# Patient Record
Sex: MALE | Race: WHITE | NOT HISPANIC OR LATINO | Employment: PART TIME | URBAN - METROPOLITAN AREA
[De-identification: names, ages, dates, MRNs, and addresses within clinical notes are randomized per-mention and may not be internally consistent; named-entity substitution may affect disease eponyms.]

---

## 2018-01-24 ENCOUNTER — TELEPHONE (OUTPATIENT)
Dept: FAMILY MEDICINE | Facility: CLINIC | Age: 23
End: 2018-01-24

## 2018-01-24 ENCOUNTER — PATIENT MESSAGE (OUTPATIENT)
Dept: INTERNAL MEDICINE | Facility: CLINIC | Age: 23
End: 2018-01-24

## 2018-01-24 DIAGNOSIS — Z11.4 ENCOUNTER FOR SCREENING FOR HIV: Primary | ICD-10-CM

## 2018-01-24 DIAGNOSIS — Z11.3 SCREEN FOR STD (SEXUALLY TRANSMITTED DISEASE): ICD-10-CM

## 2018-01-25 ENCOUNTER — PATIENT MESSAGE (OUTPATIENT)
Dept: FAMILY MEDICINE | Facility: CLINIC | Age: 23
End: 2018-01-25

## 2018-01-25 ENCOUNTER — TELEPHONE (OUTPATIENT)
Dept: FAMILY MEDICINE | Facility: CLINIC | Age: 23
End: 2018-01-25

## 2018-01-26 NOTE — TELEPHONE ENCOUNTER
----- Message from Maisha Abreu sent at 1/25/2018  3:28 PM CST -----  Contact: Self/ [phone]  Patient called in returning your call. Please call and advise.

## 2018-01-30 ENCOUNTER — OFFICE VISIT (OUTPATIENT)
Dept: INTERNAL MEDICINE | Facility: CLINIC | Age: 23
End: 2018-01-30
Payer: COMMERCIAL

## 2018-01-30 ENCOUNTER — LAB VISIT (OUTPATIENT)
Dept: LAB | Facility: HOSPITAL | Age: 23
End: 2018-01-30
Attending: INTERNAL MEDICINE
Payer: COMMERCIAL

## 2018-01-30 ENCOUNTER — TELEPHONE (OUTPATIENT)
Dept: PHARMACY | Facility: CLINIC | Age: 23
End: 2018-01-30

## 2018-01-30 VITALS
HEART RATE: 102 BPM | WEIGHT: 147.94 LBS | HEIGHT: 71 IN | OXYGEN SATURATION: 97 % | DIASTOLIC BLOOD PRESSURE: 70 MMHG | SYSTOLIC BLOOD PRESSURE: 140 MMHG | BODY MASS INDEX: 20.71 KG/M2

## 2018-01-30 DIAGNOSIS — Z11.4 SCREENING FOR HIV (HUMAN IMMUNODEFICIENCY VIRUS): ICD-10-CM

## 2018-01-30 DIAGNOSIS — F90.9 ATTENTION DEFICIT HYPERACTIVITY DISORDER (ADHD), UNSPECIFIED ADHD TYPE: ICD-10-CM

## 2018-01-30 DIAGNOSIS — Z20.6 CONTACT WITH AND (SUSPECTED) EXPOSURE TO HUMAN IMMUNODEFICIENCY VIRUS (HIV): Primary | ICD-10-CM

## 2018-01-30 DIAGNOSIS — Z11.3 SCREEN FOR STD (SEXUALLY TRANSMITTED DISEASE): ICD-10-CM

## 2018-01-30 DIAGNOSIS — Z78.9 HEPATITIS B VIRUS SEROLOGIC STATUS UNKNOWN: ICD-10-CM

## 2018-01-30 DIAGNOSIS — Z11.3 ENCOUNTER FOR SCREENING EXAMINATION FOR SEXUALLY TRANSMITTED DISEASE: ICD-10-CM

## 2018-01-30 LAB
ALBUMIN SERPL BCP-MCNC: 4.5 G/DL
ANION GAP SERPL CALC-SCNC: 10 MMOL/L
BUN SERPL-MCNC: 10 MG/DL
CALCIUM SERPL-MCNC: 9.9 MG/DL
CHLORIDE SERPL-SCNC: 103 MMOL/L
CO2 SERPL-SCNC: 28 MMOL/L
CREAT SERPL-MCNC: 0.9 MG/DL
EST. GFR  (AFRICAN AMERICAN): >60 ML/MIN/1.73 M^2
EST. GFR  (NON AFRICAN AMERICAN): >60 ML/MIN/1.73 M^2
GLUCOSE SERPL-MCNC: 99 MG/DL
PHOSPHATE SERPL-MCNC: 2.6 MG/DL
POTASSIUM SERPL-SCNC: 3.9 MMOL/L
SODIUM SERPL-SCNC: 141 MMOL/L

## 2018-01-30 PROCEDURE — 86780 TREPONEMA PALLIDUM: CPT

## 2018-01-30 PROCEDURE — 86703 HIV-1/HIV-2 1 RESULT ANTBDY: CPT

## 2018-01-30 PROCEDURE — 3008F BODY MASS INDEX DOCD: CPT | Mod: S$GLB,,, | Performed by: INTERNAL MEDICINE

## 2018-01-30 PROCEDURE — 99999 PR PBB SHADOW E&M-EST. PATIENT-LVL IV: CPT | Mod: PBBFAC,,, | Performed by: INTERNAL MEDICINE

## 2018-01-30 PROCEDURE — 86706 HEP B SURFACE ANTIBODY: CPT

## 2018-01-30 PROCEDURE — 80069 RENAL FUNCTION PANEL: CPT

## 2018-01-30 PROCEDURE — 99204 OFFICE O/P NEW MOD 45 MIN: CPT | Mod: S$GLB,,, | Performed by: INTERNAL MEDICINE

## 2018-01-30 PROCEDURE — 87591 N.GONORRHOEAE DNA AMP PROB: CPT

## 2018-01-30 PROCEDURE — 36415 COLL VENOUS BLD VENIPUNCTURE: CPT | Mod: PO

## 2018-01-30 PROCEDURE — 86592 SYPHILIS TEST NON-TREP QUAL: CPT

## 2018-01-30 PROCEDURE — 87491 CHLMYD TRACH DNA AMP PROBE: CPT | Mod: 91

## 2018-01-30 RX ORDER — EMTRICITABINE AND TENOFOVIR DISOPROXIL FUMARATE 200; 300 MG/1; MG/1
1 TABLET, FILM COATED ORAL DAILY
Qty: 30 TABLET | Refills: 0 | Status: SHIPPED | OUTPATIENT
Start: 2018-01-30 | End: 2018-02-08 | Stop reason: SDUPTHER

## 2018-01-30 NOTE — PROGRESS NOTES
Portions of this note are generated with voice recognition software. Typographical errors may exist.       Patient Name:BHUPINDER TELLEZ JR.  Patient MRN:   [id number]    History of Present Illness   ================================================================  BHUPINDER TELLEZ JR. is a 22 y.o. male here for primary care visit for  Chief Complaint   Patient presents with    [facility]     new pt       Past Medical History:   Diagnosis Date    ADHD (attention deficit hyperactivity disorder)        No past surgical history on file.    Review of patient's allergies indicates:  No Known Allergies    No current outpatient prescriptions on file prior to visit.     No current facility-administered medications on file prior to visit.        Family History   Problem Relation Age of Onset    No Known Problems Mother     No Known Problems Father     Allergy (severe) Maternal Grandmother     No Known Problems Maternal Grandfather        Social History     Social History    Marital status: Single     Spouse name: N/A    Number of children: N/A    Years of education: N/A     Occupational History    Not on file.     Social History Main Topics    Smoking status: Former Smoker    Smokeless tobacco: Never Used    Alcohol use Not on file    Drug use: Unknown    Sexual activity: Not on file     Other Topics Concern    Not on file     Social History Narrative    Melina Reece MD - Provider Directory - [facility] - former PCP        Dr. Theresa Chappell, PHD - [facility] - [facility] Clinical - diagnosis ADHD       History   Sexual Activity    Sexual activity: Not on file         SUBJECTIVE:            Medications Reviewed and Updated    Past medical, family, and social histories were reviewed and updated.    Review of Systems negative unless otherwise noted in history of present illness-  ROS      General ROS: negative  Psychological ROS: negative  ENT ROS: negative  Endocrine ROS:  Negative  Allergy and Immunology ROS: negative  Pulmonary ROS: Negative  Cardiovascular ROS: negative  Gastrointestinal ROS: negative  Genito-Urinary ROS: negative  Musculoskeletal ROS: negative  Neurological ROS: negative  Dermatological ROS: negative      Allergic:  Review of patient's allergies indicates:  No Known Allergies    OBJECTIVE:  BP: (!) 140/70 Pulse: 102    Wt Readings from Last 3 Encounters:   01/30/18 67.1 kg (147 lb 14.9 oz)    Body mass index is 20.63 kg/m².  Previous Blood Pressure Readings :   BP Readings from Last 3 Encounters:   01/30/18 (!) 140/70       Physical Exam    GEN: healthy appearing  HEENT: sclera non-icteric, conjunctiva clear  CV: no peripheral edema.   PULM: breathing non-labored  ABD: supple. protuberant abdomen.   PSYCH: appropriate affect  MSK: able to rise from chair without assistance  SKIN: normal skin turgor      Pertinent Labs Reviewed           ASSESSMENT/PLAN:    There are no diagnoses linked to this encounter.      No future appointments.    Nikhil Rodgers  1/30/2018  3:16 PM

## 2018-01-30 NOTE — PATIENT INSTRUCTIONS
Sexual health screening including screening for gonorrhea, chlamydia and syphilis is available at this clinic on a walk-in basis.  The following circumstances may warrant additional testing to help promote sexual health.    · Within the first weeks of a new sexual relationship. Even if the relationship is monogamous, many STDs do not produce symptoms, so the new partner my unknowingly have an STD from a previous partner and incorrectly assume that they are free of STDs.     · After a casual sexual encounter (e.g. with a person who is not your regular partner and is having sex with other people besides you).     · If you have any of the following symptoms including burning with urination, urinary frequency, rectal pain or discomfort, rash on the palms or soles, ulcer in the throat or around the genitals, unexplained sore throat, or gland swelling with fever.

## 2018-01-30 NOTE — TELEPHONE ENCOUNTER
Pt notified [facility] received rx for Truvada and that it was covered for $150.00. We should be able to apply a copay card to reduce the cost to $0.00 but we will need to call his insurance and verify his benefits first. Pt verbalized understanding and stated that he is still not completely sure he is ready to start therapy. He will see MD next week after reviewing material MD gave him. - RAC

## 2018-01-30 NOTE — PROGRESS NOTES
PREP FIRST VISIT NOTE     SUBJECTIVE:    This is a/an 22 y.o. male here for primary care visit for  Chief Complaint   Patient presents with    [facility]     new pt     General Sexual Risk Evaluation  · Patient Clinic Questionnaire results reviewed Yes    STI History  · Ever Treated for STI?  No  · In past 6 months have you experienced genital soreness, ulcers/wounds, or painful urination?  No  · In the past 12 months have you experienced a rash on the palms or soles? No      Sexual Partner Evaluation  · Discussed details of HIV/STI risk related to sexual partner characteristics Yes      Condom Use  · Discussed details relating to condom use and attitudes  Yes      Substance History  · Discussed relevant details relating to substance use that might increase risk for HIV acquisition Deferred      Answers for HPI/ROS submitted by the patient on 1/29/2018   activity change: No  unexpected weight change: No  rhinorrhea: Yes  trouble swallowing: No  visual disturbance: No  chest tightness: No  polyuria: No  difficulty urinating: No  joint swelling: No  arthralgias: No  confusion: No  dysphoric mood: No      Medications Reviewed and Updated    Past medical, family, and social histories were reviewed and updated.    Review of Systems negative unless noted otherwise in history of present illness-  Review of Systems   HENT: Negative for hearing loss.    Eyes: Negative for discharge.   Respiratory: Negative for wheezing.    Cardiovascular: Negative for chest pain and palpitations.   Gastrointestinal: Negative for blood in stool, constipation, diarrhea and vomiting.   Genitourinary: Negative for hematuria and urgency.   Musculoskeletal: Negative for neck pain.   Neurological: Negative for weakness and headaches.   Endo/Heme/Allergies: Negative for polydipsia.       Allergic:  Review of patient's allergies indicates:  No Known Allergies    OBJECTIVE:  BP: (!) 140/70 Pulse: 102    Wt Readings from Last 3 Encounters:    01/30/18 67.1 kg (147 lb 14.9 oz)    Body mass index is 20.63 kg/m².  Previous Blood Pressure Readings :   BP Readings from Last 3 Encounters:   01/30/18 (!) 140/70       Physical Exam    GEN: No apparent distress  HEENT: oropharynx clear, no cervical lymphadenopathy    CV: no peripheral edema  PULM: breathing non-labored  NEURO: cn ii-xii grossly intact, normal gait   SKIN: no visible rashes on the palms or exposed extremities  : Deferred      Pertinent Labs Reviewed     Contact with and (suspected) exposure to human immunodeficiency virus (hiv)  -     emtricitabine-tenofovir 200-300 mg (TRUVADA) 200-300 mg Tab; Take 1 tablet by mouth once daily.  Dispense: 30 tablet; Refill: 0    Hepatitis B virus serologic status unknown  -     Hepatitis B surface antibody; Future; Expected date: 01/30/2018    Screen for STD (sexually transmitted disease)  -     RPR; Future; Expected date: 01/30/2018    Screening for HIV (human immunodeficiency virus)  -     HIV-1 and HIV-2 antibodies; Future; Expected date: 01/30/2018    Attention deficit hyperactivity disorder (ADHD), unspecified ADHD type  -     Renal function panel; Future; Expected date: 01/30/2018    Encounter for screening examination for sexually transmitted disease  -     C. trachomatis/N. gonorrhoeae by AMP DNA Urine  -     Misc. C trach/N gonor Amplified RNA Throat  -     Misc. C trach/N gonor Amplified RNA Rectal        ASSESSMENT/PLAN:    HIV PrEP Initial Evaluation   - this patient meets clinical criteria for PrEP services as documented above.  Inconsistent condom use.  - acute HIV Infection Symptoms were not present within last 4 weeks.   - Patient wants to consider benefits risks and alternatives to prep  PLAN  - 4th generation HIV assay with confirmatory testing as indicated.   - order renal function test    STI Management Plan  - patient was screened for STI signs and symptoms today  PLAN   - appropriate action will be taken based on the findings.   -  patient counseled on safer sex methods today  - hepatitis B vaccine completed: Yes  - hepatitis A vaccine completed: Yes  - HPV vaccine completed: Yes    Future Appointments  Date Time Provider Department Center   2/6/2018 4:20 PM Nikhil Rodgers MD [facility]       Nikhil Rodgers  1/30/2018  4:13 PM

## 2018-01-31 LAB
C TRACH DNA SPEC QL NAA+PROBE: NOT DETECTED
HBV SURFACE AB SER-ACNC: POSITIVE M[IU]/ML
HIV 1+2 AB+HIV1 P24 AG SERPL QL IA: NEGATIVE
N GONORRHOEA DNA SPEC QL NAA+PROBE: NOT DETECTED
RPR SER QL: NORMAL
T PALLIDUM AB SER QL IF: NORMAL

## 2018-01-31 NOTE — TELEPHONE ENCOUNTER
DOCUMENTATION ONLY  FYI  Truvada does not require a prior authorization through the patient's insurance.    Copay: $150    Patient Assistance IS required and is being researched  [facility]

## 2018-02-01 LAB
C.TRACH RNA SOURCE: NORMAL
C.TRACH RNA SOURCE: NORMAL
C.TRACH,MISC. AMP RNA: NEGATIVE
C.TRACH,MISC. AMP RNA: NEGATIVE
N.GONORROHEAE, AMP RNA SOURCE: NORMAL
N.GONORROHEAE, AMP RNA SOURCE: NORMAL
N.GONORROHEAE, MISC. AMP RNA: NEGATIVE
N.GONORROHEAE, MISC. AMP RNA: NEGATIVE

## 2018-02-06 ENCOUNTER — OFFICE VISIT (OUTPATIENT)
Dept: INTERNAL MEDICINE | Facility: CLINIC | Age: 23
End: 2018-02-06
Payer: COMMERCIAL

## 2018-02-06 VITALS
BODY MASS INDEX: 20.49 KG/M2 | WEIGHT: 146.38 LBS | SYSTOLIC BLOOD PRESSURE: 110 MMHG | HEIGHT: 71 IN | DIASTOLIC BLOOD PRESSURE: 74 MMHG | HEART RATE: 94 BPM | OXYGEN SATURATION: 98 %

## 2018-02-06 DIAGNOSIS — Z20.6 CONTACT WITH AND (SUSPECTED) EXPOSURE TO HUMAN IMMUNODEFICIENCY VIRUS (HIV): Primary | ICD-10-CM

## 2018-02-06 PROCEDURE — 99401 PREV MED CNSL INDIV APPRX 15: CPT | Mod: S$GLB,,, | Performed by: INTERNAL MEDICINE

## 2018-02-06 PROCEDURE — 99213 OFFICE O/P EST LOW 20 MIN: CPT | Mod: PO | Performed by: INTERNAL MEDICINE

## 2018-02-06 PROCEDURE — 99999 PR PBB SHADOW E&M-EST. PATIENT-LVL III: CPT | Mod: PBBFAC,,, | Performed by: INTERNAL MEDICINE

## 2018-02-06 NOTE — PATIENT INSTRUCTIONS
We recommend that you abstain from all sexual activity until a decision has been reached regarding Truvada.  Should there be sexual activity again, repeat HIV testing would be required prior to starting Truvada    First step is to learn a few things about your current insurance plan.     We recommend that you contact insurance company to verify of the following important policy details    · What is the medical deductible for your plan and has this been satisfied? If not, how much do you have left on the deductible?  · Does your medical insurance have a doughnut hole provision?  If so, ask the representative how far away you are from reaching the doughnut hole.  · Ask when the policy deductible comes back/ resets.  Is this on the first of the year or is this on a different date of the year?    Once you have these answers please forward these answers to my office.      REDUCED COST OUTSIDE STD LABS     Testing at the [facility]     [email or web address]    Call ahead of time to schedule an appointment for comprehensive STD testing at the [facility]. Dial [phone]     Testing Should Include   - HIV (blood or saliva tests)  - syphilis (blood test)  - Chlamydia and Gonorrhea (urine and additional swabs)    Please Submit the Results of Your Tests to the [facility] BEFORE your next HIV PrEP appointment.     If any test results are positive we will help you with treatment as needed.

## 2018-02-07 ENCOUNTER — PATIENT MESSAGE (OUTPATIENT)
Dept: INTERNAL MEDICINE | Facility: CLINIC | Age: 23
End: 2018-02-07

## 2018-02-07 DIAGNOSIS — Z20.6 CONTACT WITH AND (SUSPECTED) EXPOSURE TO HUMAN IMMUNODEFICIENCY VIRUS (HIV): ICD-10-CM

## 2018-02-07 RX ORDER — EMTRICITABINE AND TENOFOVIR DISOPROXIL FUMARATE 200; 300 MG/1; MG/1
1 TABLET, FILM COATED ORAL DAILY
Qty: 30 TABLET | Refills: 0 | OUTPATIENT
Start: 2018-02-07 | End: 2019-02-07

## 2018-02-07 NOTE — TELEPHONE ENCOUNTER
Patient called - name and  confirmed.  Patient to come and conduct initial consult &  medication     Mark Neff, KIRSTEN.Ph.  Clinical Pharmacist  [facility]  Phone: [phone]

## 2018-02-08 ENCOUNTER — TELEPHONE (OUTPATIENT)
Dept: PHARMACY | Facility: CLINIC | Age: 23
End: 2018-02-08

## 2018-02-08 ENCOUNTER — TELEPHONE (OUTPATIENT)
Dept: INTERNAL MEDICINE | Facility: CLINIC | Age: 23
End: 2018-02-08

## 2018-02-08 DIAGNOSIS — Z79.899 ENCOUNTER FOR LONG-TERM (CURRENT) DRUG USE: ICD-10-CM

## 2018-02-08 DIAGNOSIS — Z11.3 ENCOUNTER FOR SCREENING EXAMINATION FOR SEXUALLY TRANSMITTED DISEASE: ICD-10-CM

## 2018-02-08 DIAGNOSIS — Z20.6 CONTACT WITH AND (SUSPECTED) EXPOSURE TO HUMAN IMMUNODEFICIENCY VIRUS (HIV): ICD-10-CM

## 2018-02-08 RX ORDER — EMTRICITABINE AND TENOFOVIR DISOPROXIL FUMARATE 200; 300 MG/1; MG/1
1 TABLET, FILM COATED ORAL DAILY
Qty: 90 TABLET | Refills: 0 | Status: SHIPPED | OUTPATIENT
Start: 2018-02-08 | End: 2018-07-11 | Stop reason: SDUPTHER

## 2018-02-08 NOTE — PROGRESS NOTES
PREP FIRST VISIT NOTE     SUBJECTIVE:    This is a/an 22 y.o. male here for primary care visit for  Chief Complaint   Patient presents with    Prep     follow up      Patient states that after his HIV test came back negative he had a sexual encounter on Saturday prior to today's visit.  Partner was HIV negative and taking Truvada for prep.  100% condom use for anal sex as the receptive partner. Voices no condom failure    Answers for HPI/ROS submitted by the patient on 2/5/2018   activity change: No  unexpected weight change: No  rhinorrhea: No  trouble swallowing: No  visual disturbance: No  chest tightness: No  polyuria: No  difficulty urinating: No  joint swelling: No  arthralgias: No  confusion: No  dysphoric mood: No      General Sexual Risk Evaluation  · Patient Clinic Questionnaire results reviewed Yes    STI History  · Ever Treated for STI?  No  · In past 6 months have you experienced genital soreness, ulcers/wounds, or painful urination?  No  · In the past 12 months have you experienced a rash on the palms or soles? No      Sexual Partner Evaluation  · Discussed details of HIV/STI risk related to sexual partner characteristics Yes      Condom Use  · Discussed details relating to condom use and attitudes  Deferred      Substance History  · Discussed relevant details relating to substance use that might increase risk for HIV acquisition Deferred      Medications Reviewed and Updated    Past medical, family, and social histories were reviewed and updated.    Review of Systems negative unless noted otherwise in history of present illness-  Review of Systems   HENT: Negative for hearing loss.    Eyes: Negative for discharge.   Respiratory: Negative for wheezing.    Cardiovascular: Negative for chest pain and palpitations.   Gastrointestinal: Negative for blood in stool, constipation, diarrhea and vomiting.   Genitourinary: Negative for hematuria and urgency.   Musculoskeletal: Negative for neck pain.    Neurological: Negative for weakness and headaches.   Endo/Heme/Allergies: Negative for polydipsia.       Allergic:  Review of patient's allergies indicates:  No Known Allergies     OBJECTIVE:  BP: 110/74 Pulse: 94    Wt Readings from Last 3 Encounters:   02/06/18 66.4 kg (146 lb 6.2 oz)   01/30/18 67.1 kg (147 lb 14.9 oz)    Body mass index is 20.42 kg/m².  Previous Blood Pressure Readings :   BP Readings from Last 3 Encounters:   02/06/18 110/74   01/30/18 (!) 140/70       Physical Exam    GEN: No apparent distress  HEENT: oropharynx clear, no cervical lymphadenopathy    CV: no peripheral edema  PULM: breathing non-labored  NEURO: cn ii-xii grossly intact, normal gait   SKIN: no visible rashes on the palms or exposed extremities  : Deferred      Pertinent Labs Reviewed       ASSESSMENT/PLAN:    HIV PrEP Initial Evaluation   - this patient meets clinical criteria for PrEP services as documented above.   - acute HIV Infection Symptoms were not present within last 4 weeks.   - medical contraindications for starting/continuing Truvada HIV PrEP have been reviewed  - patient has engaged in an informed consent process for HIV PrEP initiation or continuation.   - patient voices clear preference to start/continue PrEP in favor of alternative methods   PLAN  - Because patient has abstained from high risk sexual activity since his last HIV test we do not necessarily need to repeat HIV test prior to starting Truvada.      STI Management Plan  - patient was screened for STI signs and symptoms today  PLAN   - appropriate action will be taken based on the findings.   - patient counseled on safer sex methods today  - hepatitis B vaccine completed: Yes  - hepatitis A vaccine completed: Yes  - HPV vaccine completed: Yes    No future appointments.    Nikhil Rodgers  2/8/2018  12:09 PM

## 2018-02-08 NOTE — TELEPHONE ENCOUNTER
Patient presented to pharmacy in person to  medication.     Patient plans to start Truvada on 18.  Truvada counseling complete. Name/ confirmed. Consult included: indication; goals of treatment; administration; storage and handling; side effects; how to handle missed doses; the importance of compliance; the importance of maintaining lab appts and follow up appts w\ MD; safe sex practices with condoms. Patient understands that medication alone is not primary HIV prophylaxis. Patient understands this medication will only serve as protection for HIV and no other STDs. Patient understands to contact [facility] and MD for any medication changes due to drug interactions. He understands the importance of 3month regular testing. He understands the importance of ONCE daily dosing due to the potential renal effects of tenofovir. All questions answered and addressed to the patients satisfaction.       Discussed the importance of staying well hydrated while on therapy. Compliance stressed - patient to take missed doses as soon as remembered, but NOT to take 2 doses in one day. Patient will report questions or concerns to myself or practitioner. Patient verbalizes understanding. I will personally f/u with patient in 2 weeks from start, and [facility] will contact patient in 3 weeks to coordinate next refill.        Patient plans to start Truvada on 18. (please schedule labs accordingly)  Approximately 50 mins spent with the patient on medication education.      Thank you for allowing us to participate in the care of your patient    KIRSTEN Orantes.Ph.  Clinical Pharmacist  [facility]  Phone: [phone]

## 2018-02-08 NOTE — TELEPHONE ENCOUNTER
----- Message from Nikhil Rodgers MD sent at 2/8/2018  9:07 AM CST -----  Please contact patient for prep follow up. He needs BMP 1 month from now with urinalysis. He needs urine gonorrhea/chlaymdia, HIV and RPR at 3 months and a visit with me between 4-5 days after these lab tests. Thanks.

## 2018-02-08 NOTE — TELEPHONE ENCOUNTER
Spoke with pt to inform that doctor would like repeat labs and urine done. Pt is scheduled for all labs and urine test. Pt is also scheduled for follow with doctor. Understanding voiced.

## 2018-02-16 ENCOUNTER — TELEPHONE (OUTPATIENT)
Dept: PHARMACY | Facility: CLINIC | Age: 23
End: 2018-02-16

## 2018-02-16 NOTE — TELEPHONE ENCOUNTER
Initial clinical follow-up conducted for Truvada. Name/ confirmed. no missed doses; no new medications; no side effects noted. Patient understands to report any medication changes to [facility] and provider. All questions answered and addressed to patients satisfaction.      Patient noted that he is using a phone alarm to maintain adherence.     KIRSTEN Orantes.Ph.  Clinical Pharmacist  [facility]  Phone: [phone]

## 2018-02-16 NOTE — TELEPHONE ENCOUNTER
Patient called for clinical f/u on Truvada - na - lvm for call back.     KIRSTEN Orantes.Ph.  Clinical Pharmacist  [facility]  Phone: [phone]

## 2018-02-27 ENCOUNTER — TELEPHONE (OUTPATIENT)
Dept: INTERNAL MEDICINE | Facility: CLINIC | Age: 23
End: 2018-02-27

## 2018-02-27 ENCOUNTER — PATIENT MESSAGE (OUTPATIENT)
Dept: INTERNAL MEDICINE | Facility: CLINIC | Age: 23
End: 2018-02-27

## 2018-02-27 NOTE — TELEPHONE ENCOUNTER
----- Message from Lulu Mejia sent at 2/27/2018  1:32 PM CST -----  Contact: [phone]/self  Patient requesting to speak with you regarding his insurance and his upcoming visit. Please advise.

## 2018-02-27 NOTE — TELEPHONE ENCOUNTER
Spoke with pt to inform that in his chart it shows that he is uninsured. Pt states that at his last visit he paid a co-pay. Pt was instructed to call insurance company to get verification on what he is limited to. Pt voiced understanding.

## 2018-02-28 ENCOUNTER — TELEPHONE (OUTPATIENT)
Dept: INTERNAL MEDICINE | Facility: CLINIC | Age: 23
End: 2018-02-28

## 2018-02-28 NOTE — TELEPHONE ENCOUNTER
Spoke with pt to get verbal understanding on his request to send lab orders to [facility]. Understanding voiced.

## 2018-02-28 NOTE — TELEPHONE ENCOUNTER
----- Message from Bety Hall sent at 2/28/2018 10:48 AM CST -----  Contact: [phone]/ self   Pt its requesting his lab work and urine order to be fax to [facility] in [place] fax number [phone]. Please advise

## 2018-03-06 ENCOUNTER — TELEPHONE (OUTPATIENT)
Dept: INTERNAL MEDICINE | Facility: CLINIC | Age: 23
End: 2018-03-06

## 2018-03-06 DIAGNOSIS — Z79.899 ENCOUNTER FOR LONG-TERM (CURRENT) DRUG USE: ICD-10-CM

## 2018-03-06 DIAGNOSIS — Z20.6 CONTACT WITH AND (SUSPECTED) EXPOSURE TO HUMAN IMMUNODEFICIENCY VIRUS (HIV): Primary | ICD-10-CM

## 2018-03-06 DIAGNOSIS — Z11.3 ENCOUNTER FOR SCREENING EXAMINATION FOR SEXUALLY TRANSMITTED DISEASE: ICD-10-CM

## 2018-03-06 NOTE — TELEPHONE ENCOUNTER
----- Message from Kendra Foremanron sent at 3/6/2018 12:15 PM CST -----  Contact: self / [phone]  Patient is requesting a call back regarding his below prescription. And orders for his blood work. Please advise        emtricitabine-tenofovir 200-300 mg (TRUVADA) 200-300 mg Tab    Send to: Pharmacy     [facility] [place] [place] - [id number]

## 2018-03-06 NOTE — TELEPHONE ENCOUNTER
Spoke with pt who states he call [facility] to schedule labs and was told they didn't have any orders for him. Pt is requesting orders be sent to the [facility] in [place]. Pt was informed that a message will be routed to the doctor. Pt voiced understanding voiced.

## 2018-03-07 ENCOUNTER — PATIENT MESSAGE (OUTPATIENT)
Dept: INTERNAL MEDICINE | Facility: CLINIC | Age: 23
End: 2018-03-07

## 2018-03-07 NOTE — TELEPHONE ENCOUNTER
----- Message from Eloisa Dorantes MA sent at 3/6/2018  2:19 PM CST -----  Contact: self / [phone]  Pt requesting lab orders be sent to [facility] at [facility]. Jennifer Hawley. To the fax number [phone]. Please advise.   ----- Message -----  From: Kendra Flores  Sent: 3/6/2018  12:15 PM  To: Vishal DINERO Staff    Patient is requesting a call back regarding his below prescription. And orders for his blood work. Please advise        emtricitabine-tenofovir 200-300 mg (TRUVADA) 200-300 mg Tab    Send to: Pharmacy     [facility] [place] [address]

## 2018-03-07 NOTE — TELEPHONE ENCOUNTER
Orders sent electronically to [facility].  Once HIV test results return we can approve a refill of Truvada.

## 2018-03-08 ENCOUNTER — TELEPHONE (OUTPATIENT)
Dept: PHARMACY | Facility: CLINIC | Age: 23
End: 2018-03-08

## 2018-04-02 ENCOUNTER — TELEPHONE (OUTPATIENT)
Dept: PHARMACY | Facility: CLINIC | Age: 23
End: 2018-04-02

## 2018-04-27 ENCOUNTER — TELEPHONE (OUTPATIENT)
Dept: PHARMACY | Facility: CLINIC | Age: 23
End: 2018-04-27

## 2018-05-31 ENCOUNTER — TELEPHONE (OUTPATIENT)
Dept: PHARMACY | Facility: CLINIC | Age: 23
End: 2018-05-31

## 2018-05-31 NOTE — TELEPHONE ENCOUNTER
Patient contacted via B[employer] for reminder on labs and f/u with provider.     KIRSTEN Orantes.Ph.  Clinical Pharmacist  [facility]  Phone: [phone]

## 2018-06-02 ENCOUNTER — PATIENT MESSAGE (OUTPATIENT)
Dept: INTERNAL MEDICINE | Facility: CLINIC | Age: 23
End: 2018-06-02

## 2018-06-02 DIAGNOSIS — Z20.6 CONTACT WITH AND (SUSPECTED) EXPOSURE TO HUMAN IMMUNODEFICIENCY VIRUS (HIV): Primary | ICD-10-CM

## 2018-06-02 DIAGNOSIS — Z11.3 ENCOUNTER FOR SCREENING EXAMINATION FOR SEXUALLY TRANSMITTED DISEASE: ICD-10-CM

## 2018-06-02 DIAGNOSIS — Z79.899 ENCOUNTER FOR LONG-TERM (CURRENT) DRUG USE: ICD-10-CM

## 2018-06-12 DIAGNOSIS — Z20.6 CONTACT WITH AND (SUSPECTED) EXPOSURE TO HUMAN IMMUNODEFICIENCY VIRUS (HIV): ICD-10-CM

## 2018-06-12 RX ORDER — EMTRICITABINE AND TENOFOVIR DISOPROXIL FUMARATE 200; 300 MG/1; MG/1
1 TABLET, FILM COATED ORAL DAILY
Qty: 90 TABLET | Refills: 0 | OUTPATIENT
Start: 2018-06-12 | End: 2019-06-12

## 2018-06-13 ENCOUNTER — TELEPHONE (OUTPATIENT)
Dept: PHARMACY | Facility: CLINIC | Age: 23
End: 2018-06-13

## 2018-06-13 NOTE — TELEPHONE ENCOUNTER
Patient contacted via wedgies regarding refill authorization denial and need for appointment to continue Truvada for PrEP.     KIRSTEN Orantes.Ph.  Clinical Pharmacist  [facility]  Phone: [phone]

## 2018-07-10 ENCOUNTER — LAB VISIT (OUTPATIENT)
Dept: LAB | Facility: HOSPITAL | Age: 23
End: 2018-07-10
Attending: INTERNAL MEDICINE
Payer: MEDICAID

## 2018-07-10 ENCOUNTER — OFFICE VISIT (OUTPATIENT)
Dept: INTERNAL MEDICINE | Facility: CLINIC | Age: 23
End: 2018-07-10
Payer: MEDICAID

## 2018-07-10 VITALS
OXYGEN SATURATION: 96 % | TEMPERATURE: 98 F | DIASTOLIC BLOOD PRESSURE: 84 MMHG | BODY MASS INDEX: 18.49 KG/M2 | HEIGHT: 71 IN | HEART RATE: 106 BPM | WEIGHT: 132.06 LBS | SYSTOLIC BLOOD PRESSURE: 120 MMHG

## 2018-07-10 DIAGNOSIS — N45.1 EPIDIDYMITIS, LEFT: ICD-10-CM

## 2018-07-10 DIAGNOSIS — Z11.3 ENCOUNTER FOR SCREENING EXAMINATION FOR SEXUALLY TRANSMITTED DISEASE: ICD-10-CM

## 2018-07-10 DIAGNOSIS — Z20.6 CONTACT WITH AND (SUSPECTED) EXPOSURE TO HUMAN IMMUNODEFICIENCY VIRUS (HIV): ICD-10-CM

## 2018-07-10 DIAGNOSIS — Z11.4 ENCOUNTER FOR SPECIAL SCREENING EXAMINATION FOR HIV: ICD-10-CM

## 2018-07-10 DIAGNOSIS — A56.8 CHLAMYDIA TRACHOMATIS INFECTION: Primary | ICD-10-CM

## 2018-07-10 DIAGNOSIS — Z78.9 HEPATITIS B VIRUS SEROLOGIC STATUS UNKNOWN: ICD-10-CM

## 2018-07-10 PROCEDURE — 99999 PR PBB SHADOW E&M-EST. PATIENT-LVL IV: CPT | Mod: PBBFAC,,, | Performed by: INTERNAL MEDICINE

## 2018-07-10 PROCEDURE — 87340 HEPATITIS B SURFACE AG IA: CPT

## 2018-07-10 PROCEDURE — 86703 HIV-1/HIV-2 1 RESULT ANTBDY: CPT

## 2018-07-10 PROCEDURE — 87491 CHLMYD TRACH DNA AMP PROBE: CPT

## 2018-07-10 PROCEDURE — 36415 COLL VENOUS BLD VENIPUNCTURE: CPT | Mod: PO

## 2018-07-10 PROCEDURE — 96372 THER/PROPH/DIAG INJ SC/IM: CPT | Mod: PBBFAC,PO

## 2018-07-10 PROCEDURE — 86592 SYPHILIS TEST NON-TREP QUAL: CPT

## 2018-07-10 PROCEDURE — 87591 N.GONORRHOEAE DNA AMP PROB: CPT | Mod: 91

## 2018-07-10 PROCEDURE — 99214 OFFICE O/P EST MOD 30 MIN: CPT | Mod: PBBFAC,PO | Performed by: INTERNAL MEDICINE

## 2018-07-10 PROCEDURE — 99215 OFFICE O/P EST HI 40 MIN: CPT | Mod: S$PBB,,, | Performed by: INTERNAL MEDICINE

## 2018-07-10 RX ORDER — AZITHROMYCIN 250 MG/1
1000 TABLET, FILM COATED ORAL ONCE
Status: COMPLETED | OUTPATIENT
Start: 2018-07-10 | End: 2018-07-10

## 2018-07-10 RX ORDER — CEFTRIAXONE 250 MG/1
250 INJECTION, POWDER, FOR SOLUTION INTRAMUSCULAR; INTRAVENOUS
Status: COMPLETED | OUTPATIENT
Start: 2018-07-10 | End: 2018-07-10

## 2018-07-10 RX ADMIN — CEFTRIAXONE SODIUM 250 MG: 250 INJECTION, POWDER, FOR SOLUTION INTRAMUSCULAR; INTRAVENOUS at 10:07

## 2018-07-10 RX ADMIN — AZITHROMYCIN 1000 MG: 250 TABLET, FILM COATED ORAL at 10:07

## 2018-07-10 NOTE — PATIENT INSTRUCTIONS
Recommendations for today    We will recommend that you refrain from sexual activity for at least 7 days after today's treatment.     You also should not resume sexual activity if you have any of the following symptoms  · Burning with urination  · Abnormal discharge from the penis or the rectum.  · Rectal pain  · Swollen and tender glands of the groin or the throat  · Sore throat    Do not resume sexual activity with any previous partners until you are sure that they have received testing for STDs and completed treatment if necessary.

## 2018-07-11 ENCOUNTER — TELEPHONE (OUTPATIENT)
Dept: PHARMACY | Facility: CLINIC | Age: 23
End: 2018-07-11

## 2018-07-11 ENCOUNTER — PATIENT MESSAGE (OUTPATIENT)
Dept: INTERNAL MEDICINE | Facility: CLINIC | Age: 23
End: 2018-07-11

## 2018-07-11 LAB
C TRACH DNA SPEC QL NAA+PROBE: DETECTED
HBV SURFACE AG SERPL QL IA: NEGATIVE
HIV 1+2 AB+HIV1 P24 AG SERPL QL IA: NEGATIVE
N GONORRHOEA DNA SPEC QL NAA+PROBE: NOT DETECTED
RPR SER QL: NORMAL

## 2018-07-11 RX ORDER — EMTRICITABINE AND TENOFOVIR DISOPROXIL FUMARATE 200; 300 MG/1; MG/1
1 TABLET, FILM COATED ORAL DAILY
Qty: 90 TABLET | Refills: 0 | Status: SHIPPED | OUTPATIENT
Start: 2018-07-11 | End: 2018-10-31

## 2018-07-11 NOTE — PROGRESS NOTES
SUBJECTIVE:    This is a/an 23 y.o. male here for primary care visit for  Chief Complaint   Patient presents with    Groin Pain     left side x 1 week       General Sexual Risk Evaluation  · Patient Clinic Questionnaire results reviewed Yes    FOLLOW UP EVALUATION  - Symptoms of acute HIV disease past 4 weeks: no   - PrEP side effects: no   - Significant adherence lapses: yes medication was suspended because patient did not complete necessary repeat HIV testing due to insurance barriers which have now been resolved  - Started any new over the counter medicines or dietary supplements: no     STD History  Since last visit have you experienced   · Genital soreness, ulcers/wounds, or painful urination?  Yes  · Rash on the palms or soles? No  · Treated for STI?  No    Sexual Partner Evaluation  · Discussed details of HIV/STI risk related to sexual partner characteristics Yes    Condom Use  · Discussed details relating to condom use and attitudes  Yes      Substance History  · Discussed relevant details relating to substance use that might increase risk for HIV acquisition Deferred      Medications Reviewed and Updated    Past medical, family, and social histories were reviewed and updated.    Review of Systems negative unless noted otherwise in history of present illness-  ROS    Allergic:  Review of patient's allergies indicates:  No Known Allergies    OBJECTIVE:  BP: 120/84 Pulse: 106 Temp: 97.6 °F (36.4 °C)  Wt Readings from Last 3 Encounters:   07/10/18 59.9 kg (132 lb 0.9 oz)   02/06/18 66.4 kg (146 lb 6.2 oz)   01/30/18 67.1 kg (147 lb 14.9 oz)    Body mass index is 18.42 kg/m².  Previous Blood Pressure Readings :   BP Readings from Last 3 Encounters:   07/10/18 120/84   02/06/18 110/74   01/30/18 (!) 140/70       Physical Exam    GEN: No apparent distress  HEENT: oropharynx clear, no cervical lymphadenopathy    CV: no peripheral edema  PULM: breathing non-labored  NEURO: cn ii-xii grossly intact, normal gait    SKIN: no visible rashes on the palms or exposed extremities  : Diffusely swollen left testis.       Pertinent Labs Reviewed       ASSESSMENT/PLAN:    ASSESSMENT  - HIV PrEP compliance reviewed today and deemed to be Adequate  - reviewed patient home medications for new interactions with Truvada PreP  no    PLAN  - temporary suspension of HIV PreP indicated  no  - intensive compliance counseling indicated  no  - repeat HIV testing indicated (routine q1-3 month) yes   - repeat renal lab indicated no   - repeat HCV Ab testing indicated no   - repeat Hep B serology testing indicated no     STI Management Plan  - patient was screened for STI signs and symptoms today  - appropriate action will be taken based on today's findings.   PLAN   - patient counseled on safer sex methods today  - recommended vaccinations completed: vaccination ongoing    - HAV vax complete   Likely necessary  - HBV series complete  yes  - HPV series complete  Likely necessary    Chlamydia trachomatis infection.Condition not optimally controlled. Detailed counseling on self care measures. Plan to monitor clinically in addition to plan below or as listed on After Visit Summary.   -     cefTRIAXone injection 250 mg; Inject 250 mg into the muscle one time.  -     azithromycin tablet 1,000 mg; Take 4 tablets (1,000 mg total) by mouth once.  -     C. trachomatis/N. gonorrhoeae by AMP DNA Urine  -     Misc. C trach/N gonor Amplified RNA Throat  -     RPR; Future; Expected date: 07/10/2018  -     HIV-1 and HIV-2 antibodies; Future; Expected date: 07/10/2018    Encounter for special screening examination for HIV  -     HIV-1 and HIV-2 antibodies; Future; Expected date: 07/10/2018    Hepatitis B virus serologic status unknown  -     Hepatitis B surface antigen; Future; Expected date: 07/10/2018    Encounter for screening examination for sexually transmitted disease  -     C. trachomatis/N. gonorrhoeae by AMP DNA Urine  -     Misc. C trach/N gonor  Amplified RNA Throat    HIV PrEP Patient  -     HIV-1 and HIV-2 antibodies; Standing  -     emtricitabine-tenofovir 200-300 mg (TRUVADA) 200-300 mg Tab; Take 1 tablet by mouth once daily.  Dispense: 90 tablet; Refill: 0          Future Appointments  Date Time Provider Department [place]   10/3/2018 9:00 AM LAB, [facility] [place]   10/10/2018 10:00 AM [facility]hil [facility]  7/11/2018  12:18 PM

## 2018-07-11 NOTE — TELEPHONE ENCOUNTER
Attempted to contact patient for refill and follow up for Truvada.  Unable to contact patient - [facility].

## 2018-07-12 LAB
C TRACH RRNA SPEC QL NAA+PROBE: NEGATIVE
C.TRACH RNA SOURCE: NORMAL
N GONORRHOEA RRNA SPEC QL NAA+PROBE: NEGATIVE
N.GONORROHEAE, AMP RNA SOURCE: NORMAL

## 2018-07-13 NOTE — TELEPHONE ENCOUNTER
Attempted to contact patient for refill and follow up for Truvada.  Unable to contact patient - [facility].

## 2018-07-16 NOTE — TELEPHONE ENCOUNTER
Truvada refill confirmed.  Patient plans on picking up Truvada by .   $0 copay- 004. Confirmed 2 patient identifiers - name and .      Patient is a restart PrEP with 0 Truvada remaining.  No side effects noted.  No missed doses, no new medications, no new allergies or health conditions reported at this time. All questions answered and addressed to patients satisfaction. Pt verbalized understanding.

## 2018-08-06 ENCOUNTER — PATIENT MESSAGE (OUTPATIENT)
Dept: ADMINISTRATIVE | Facility: OTHER | Age: 23
End: 2018-08-06

## 2018-08-08 NOTE — TELEPHONE ENCOUNTER
Patient called for refill readiness and follow up on Truvada.  No answer - lvm for call back.     Mark Neff, KIRSTEN.Ph.  Clinical Pharmacist  [facility]  Phone: [phone]

## 2018-08-09 NOTE — TELEPHONE ENCOUNTER
Refill readiness for Truvada confirmed with patient; name/ confirmed; no missed doses; no new medications; no side effects noted; patient will  at [facility] on  or thereafter.     Clinical follow-up conducted for Truvada. Name/ confirmed. no missed doses; no new medications; no side effects noted. Patient understands to report any medication changes to [facility] and provider. All questions answered and addressed to patients satisfaction.      KIRSTEN Orantes.Ph.  Clinical Pharmacist  [facility]  Phone: [phone]

## 2018-09-04 ENCOUNTER — PATIENT MESSAGE (OUTPATIENT)
Dept: ADMINISTRATIVE | Facility: OTHER | Age: 23
End: 2018-09-04

## 2018-09-10 ENCOUNTER — TELEPHONE (OUTPATIENT)
Dept: PHARMACY | Facility: CLINIC | Age: 23
End: 2018-09-10

## 2018-09-25 ENCOUNTER — TELEPHONE (OUTPATIENT)
Dept: PHARMACY | Facility: CLINIC | Age: 23
End: 2018-09-25

## 2018-10-10 ENCOUNTER — PATIENT MESSAGE (OUTPATIENT)
Dept: ADMINISTRATIVE | Facility: OTHER | Age: 23
End: 2018-10-10

## 2018-10-10 ENCOUNTER — LAB VISIT (OUTPATIENT)
Dept: LAB | Facility: HOSPITAL | Age: 23
End: 2018-10-10
Attending: INTERNAL MEDICINE
Payer: MEDICAID

## 2018-10-10 ENCOUNTER — OFFICE VISIT (OUTPATIENT)
Dept: INTERNAL MEDICINE | Facility: CLINIC | Age: 23
End: 2018-10-10
Payer: MEDICAID

## 2018-10-10 ENCOUNTER — TELEPHONE (OUTPATIENT)
Dept: INTERNAL MEDICINE | Facility: CLINIC | Age: 23
End: 2018-10-10

## 2018-10-10 VITALS
SYSTOLIC BLOOD PRESSURE: 122 MMHG | OXYGEN SATURATION: 98 % | DIASTOLIC BLOOD PRESSURE: 88 MMHG | WEIGHT: 143.06 LBS | BODY MASS INDEX: 20.03 KG/M2 | HEIGHT: 71 IN | HEART RATE: 88 BPM

## 2018-10-10 DIAGNOSIS — Z51.81 MEDICATION MONITORING ENCOUNTER: ICD-10-CM

## 2018-10-10 DIAGNOSIS — Z20.6 CONTACT WITH AND (SUSPECTED) EXPOSURE TO HUMAN IMMUNODEFICIENCY VIRUS (HIV): ICD-10-CM

## 2018-10-10 DIAGNOSIS — Z20.6 CONTACT WITH AND (SUSPECTED) EXPOSURE TO HUMAN IMMUNODEFICIENCY VIRUS (HIV): Primary | ICD-10-CM

## 2018-10-10 DIAGNOSIS — Z11.3 SCREENING EXAMINATION FOR STD (SEXUALLY TRANSMITTED DISEASE): ICD-10-CM

## 2018-10-10 LAB
ALBUMIN SERPL BCP-MCNC: 4.4 G/DL
ANION GAP SERPL CALC-SCNC: 9 MMOL/L
BUN SERPL-MCNC: 11 MG/DL
CALCIUM SERPL-MCNC: 9.8 MG/DL
CHLORIDE SERPL-SCNC: 101 MMOL/L
CO2 SERPL-SCNC: 28 MMOL/L
CREAT SERPL-MCNC: 0.8 MG/DL
EST. GFR  (AFRICAN AMERICAN): >60 ML/MIN/1.73 M^2
EST. GFR  (NON AFRICAN AMERICAN): >60 ML/MIN/1.73 M^2
GLUCOSE SERPL-MCNC: 88 MG/DL
PHOSPHATE SERPL-MCNC: 2.8 MG/DL
POTASSIUM SERPL-SCNC: 3.8 MMOL/L
SODIUM SERPL-SCNC: 138 MMOL/L

## 2018-10-10 PROCEDURE — 86592 SYPHILIS TEST NON-TREP QUAL: CPT

## 2018-10-10 PROCEDURE — 99999 PR PBB SHADOW E&M-EST. PATIENT-LVL IV: CPT | Mod: PBBFAC,,, | Performed by: INTERNAL MEDICINE

## 2018-10-10 PROCEDURE — 36415 COLL VENOUS BLD VENIPUNCTURE: CPT | Mod: PO

## 2018-10-10 PROCEDURE — 80069 RENAL FUNCTION PANEL: CPT

## 2018-10-10 PROCEDURE — 86703 HIV-1/HIV-2 1 RESULT ANTBDY: CPT

## 2018-10-10 PROCEDURE — 99214 OFFICE O/P EST MOD 30 MIN: CPT | Mod: PBBFAC,PO | Performed by: INTERNAL MEDICINE

## 2018-10-10 PROCEDURE — 99214 OFFICE O/P EST MOD 30 MIN: CPT | Mod: S$PBB,,, | Performed by: INTERNAL MEDICINE

## 2018-10-10 NOTE — PROGRESS NOTES
SUBJECTIVE:    This is a/an 23 y.o. male here for primary care visit for  Chief Complaint   Patient presents with    Prep     3 month follow up      Patient completed HPV vaccination in high school and states that he got hepatitis B and hepatitis a vaccination all at the same time.    General Sexual Risk Evaluation  · Patient Clinic Questionnaire results reviewed Yes    FOLLOW UP EVALUATION  - Symptoms of acute HIV disease past 4 weeks: no   - PrEP side effects: no   - Significant adherence lapses: yes   - Started any new over the counter medicines or dietary supplements: no     The patient was taking Truvada consistently until about the middle of August.  The patient has a few reasons for stopping the Truvada.  One of them was that he begin to review information available about Truvada failure and became concerned that some of his sexual activity might have put him at risk of exposure to HIV.  He is clear today that his concerns about the efficacy of Truvada was not because of inconsistent Truvada usage at the time of sexual exposures.  He does not knowingly have an experience in the past with a person living with HIV.  With some reassurance about the rarity of Truvada failure the patient states that he will not discontinue Truvada in the future without consulting the office 1st.  He is recommended to only discontinue Truvada in the setting of inconsistent use despite ongoing sexual activities with significant risk for HIV exposure    STD History  Since last visit have you experienced   · Genital soreness, ulcers/wounds, or painful urination?  No  · Rash on the palms or soles? No  · Treated for STI?  No   · The patient did contact the partner who he believed was his STD contact.    Sexual Partner Evaluation  · Discussed details of HIV/STI risk related to sexual partner characteristics Deferred   · Casual sexual partners associated with a trip to [place] in July after he completed his evaluation with us.  · The  patient states that he did not come in contact with the previous sexual partner who he believes would have been his STI contact    Condom Use  · Discussed details relating to condom use and attitudes  Deferred      Substance History  · Discussed relevant details relating to substance use that might increase risk for HIV acquisition yes  · Patient endorses occasional marijuana use.  He states that it is not synthetic.      Medications Reviewed and Updated    Past medical, family, and social histories were reviewed and updated.    Review of Systems negative unless noted otherwise in history of present illness-  ROS    Allergic:  Review of patient's allergies indicates:  No Known Allergies    OBJECTIVE:  BP: 122/88 Pulse: 88    Wt Readings from Last 3 Encounters:   10/10/18 64.9 kg (143 lb 1.3 oz)   07/10/18 59.9 kg (132 lb 0.9 oz)   02/06/18 66.4 kg (146 lb 6.2 oz)    Body mass index is 19.96 kg/m².  Previous Blood Pressure Readings :   BP Readings from Last 3 Encounters:   10/10/18 122/88   07/10/18 120/84   02/06/18 110/74       Physical Exam    GEN: No apparent distress  HEENT: oropharynx clear, no cervical lymphadenopathy    CV: no peripheral edema  PULM: breathing non-labored  NEURO: cn ii-xii grossly intact, normal gait   SKIN: no visible rashes on the palms or exposed extremities  : Deferred      Pertinent Labs Reviewed       ASSESSMENT/PLAN:    ASSESSMENT  - HIV PrEP compliance reviewed today and deemed to be Inadequate  - reviewed patient home medications for new interactions with Truvada PreP  no    PLAN  - temporary suspension of HIV PreP indicated  no  - intensive compliance counseling indicated  no  - repeat HIV testing indicated (routine q1-3 month) yes   - repeat renal lab indicated yes   - repeat HCV Ab testing indicated no   - repeat Hep B serology testing indicated no     STI Management Plan  - patient was screened for STI signs and symptoms today  - appropriate action will be taken based on  today's findings.   PLAN   - patient counseled on safer sex methods today  - recommended vaccinations completed: vaccinations completed    - HAV vax complete   yes  - HBV series complete  yes  - HPV series complete  yes      Future Appointments   Date Time Provider Department [place]   1/7/2019  8:00 AM LAB, [facility] [place]   1/11/2019 10:00 AM Nikhil Rodgers MD [facility] [place]       Nikhil Rodgers  10/10/2018  11:41 AM

## 2018-10-10 NOTE — PROGRESS NOTES
Portions of this note are generated with voice recognition software. Typographical errors may exist.     SUBJECTIVE:    This is a/an 23 y.o. male here for primary care visit for  Chief Complaint   Patient presents with    Prep     3 month follow up            joe in     Hep A got it         Medications Reviewed and Updated    Past medical, family, and social histories were reviewed and updated.    Review of Systems negative unless otherwise noted in history of present illness-  ROS    General ROS: negative  Psychological ROS: negative  ENT ROS: negative  Endocrine ROS: Negative  Allergy and Immunology ROS: negative  Cardiovascular ROS: negative  Pulmonary ROS: Negative  Gastrointestinal ROS: negative  Genito-Urinary ROS: negative  Musculoskeletal ROS: negative  Neurological ROS: negative  Dermatological ROS: negative        Allergic:  Review of patient's allergies indicates:  No Known Allergies    OBJECTIVE:  BP: 122/88 Pulse: 88    Wt Readings from Last 3 Encounters:   10/10/18 64.9 kg (143 lb 1.3 oz)   07/10/18 59.9 kg (132 lb 0.9 oz)   02/06/18 66.4 kg (146 lb 6.2 oz)    Body mass index is 19.96 kg/m².  Previous Blood Pressure Readings :   BP Readings from Last 3 Encounters:   10/10/18 122/88   07/10/18 120/84   02/06/18 110/74       Physical Exam    GEN: No apparent distress  HEENT: sclera non-icteric, conjunctiva clear  CV: no peripheral edema  PULM: breathing non-labored  ABD: Obese, protuberant abdomen.  PSYCH: appropriate affect  MSK: able to rise from chair without assistance  SKIN: normal skin turgor    Pertinent Labs Reviewed       ASSESSMENT/PLAN:    There are no diagnoses linked to this encounter.      No future appointments.    Nikhil Rodgers  10/10/2018  10:20 AM

## 2018-10-10 NOTE — TELEPHONE ENCOUNTER
----- Message from Mark Neff PharmD sent at 10/10/2018 12:34 PM CDT -----  Absolutely.  Thank you for letting us know.     Mark.   ----- Message -----  From: Nikhil Rodgers MD  Sent: 10/10/2018  11:48 AM  To: Mark Neff PharmD    This patient is due to resume Truvada for prep.  He has adequate tablets for now because he discontinued the Truvada in the middle of August.  Once his HIV test has come back negative he is to resume refills with specialty pharmacy.  Can you please contact him to get him on the appropriate refill schedule?

## 2018-10-11 LAB
HIV 1+2 AB+HIV1 P24 AG SERPL QL IA: NEGATIVE
RPR SER QL: NORMAL

## 2018-10-18 NOTE — TELEPHONE ENCOUNTER
Patient called to confirm current dose count after re-starting Truvada as PrEP. NA LVM.     Mark Neff, R.Ph.  Clinical Pharmacist  [facility]  Phone: [phone]

## 2018-10-23 NOTE — TELEPHONE ENCOUNTER
Patient called for dose count on Truvada after re-start with existing med supply following labs.  NA - LVM.    KIRSTEN Orantes.Ph.  Clinical Pharmacist  [facility]  Phone: [phone]

## 2018-10-31 ENCOUNTER — PATIENT MESSAGE (OUTPATIENT)
Dept: INTERNAL MEDICINE | Facility: CLINIC | Age: 23
End: 2018-10-31

## 2018-10-31 NOTE — TELEPHONE ENCOUNTER
Patient called.  Name and  confirmed.  Patient stated that he is currently no longer taking Truvada as PrEP.  He is now in a committed monogamous relationship and feels that it is no longer needed or indicated.     Patient was informed that if this should change or he wishes to reconsider starting PrEP in the future to contact his provider to re-start. Patient acknowledged understanding.     Mark Neff, KIRSTEN.Ph., AAHIVP  Clinical Pharmacist, HIV  [facility]  Phone: [phone]

## 2018-12-07 NOTE — TELEPHONE ENCOUNTER
Pt has received refills for buspar 10mg and buspar 7.5mg from [facility]. She does not use the 7.5mg tablets any longer and does not want this refilled again. Note made in chart and pt to also contact [facility] to have this removed from auto refill.    Spoke with pt who states that he is unsure if Dr. Rodgers is covered on his insurance. Pt was informed that only the  staff has that information. Pt was informed that someone will give him a call back. Understanding voiced.

## 2019-01-24 ENCOUNTER — TELEPHONE (OUTPATIENT)
Dept: INTERNAL MEDICINE | Facility: CLINIC | Age: 24
End: 2019-01-24

## 2019-01-24 DIAGNOSIS — Z20.2 EXPOSURE TO SEXUALLY TRANSMITTED DISEASE (STD): Primary | ICD-10-CM

## 2019-01-24 NOTE — TELEPHONE ENCOUNTER
----- Message from Bety Hall sent at 1/24/2019  3:50 PM CST -----  Contact: [phone]/ self   Pt its requesting an appointment for next week , states he is having problems  . Pt has medicaid . Please advise

## 2019-01-24 NOTE — TELEPHONE ENCOUNTER
Spoke with patient and he is requesting appt for throat pain and would like a special swab. Informed will send message to Dr. Rodgers and call back with recommendations.

## 2019-01-31 ENCOUNTER — PATIENT MESSAGE (OUTPATIENT)
Dept: INTERNAL MEDICINE | Facility: CLINIC | Age: 24
End: 2019-01-31

## 2019-02-05 ENCOUNTER — OFFICE VISIT (OUTPATIENT)
Dept: INTERNAL MEDICINE | Facility: CLINIC | Age: 24
End: 2019-02-05
Payer: MEDICAID

## 2019-02-05 ENCOUNTER — LAB VISIT (OUTPATIENT)
Dept: LAB | Facility: HOSPITAL | Age: 24
End: 2019-02-05
Attending: INTERNAL MEDICINE
Payer: MEDICAID

## 2019-02-05 VITALS
SYSTOLIC BLOOD PRESSURE: 114 MMHG | HEART RATE: 104 BPM | OXYGEN SATURATION: 97 % | DIASTOLIC BLOOD PRESSURE: 86 MMHG | WEIGHT: 142.19 LBS | BODY MASS INDEX: 19.91 KG/M2 | HEIGHT: 71 IN

## 2019-02-05 DIAGNOSIS — Z11.3 SCREEN FOR STD (SEXUALLY TRANSMITTED DISEASE): ICD-10-CM

## 2019-02-05 DIAGNOSIS — Z11.59 ENCOUNTER FOR HEPATITIS C VIRUS SCREENING TEST FOR HIGH RISK PATIENT: ICD-10-CM

## 2019-02-05 DIAGNOSIS — Z20.6 CONTACT WITH AND (SUSPECTED) EXPOSURE TO HUMAN IMMUNODEFICIENCY VIRUS (HIV): Primary | ICD-10-CM

## 2019-02-05 DIAGNOSIS — Z91.89 ENCOUNTER FOR HEPATITIS C VIRUS SCREENING TEST FOR HIGH RISK PATIENT: ICD-10-CM

## 2019-02-05 DIAGNOSIS — Z78.9 HEPATITIS B VIRUS SEROLOGIC STATUS UNKNOWN: ICD-10-CM

## 2019-02-05 DIAGNOSIS — J02.9 ACUTE PHARYNGITIS, UNSPECIFIED ETIOLOGY: ICD-10-CM

## 2019-02-05 DIAGNOSIS — Z20.6 CONTACT WITH AND (SUSPECTED) EXPOSURE TO HUMAN IMMUNODEFICIENCY VIRUS (HIV): ICD-10-CM

## 2019-02-05 PROCEDURE — 87591 N.GONORRHOEAE DNA AMP PROB: CPT

## 2019-02-05 PROCEDURE — 86803 HEPATITIS C AB TEST: CPT

## 2019-02-05 PROCEDURE — 99395 PR PREVENTIVE VISIT,EST,18-39: ICD-10-PCS | Mod: S$PBB,,, | Performed by: INTERNAL MEDICINE

## 2019-02-05 PROCEDURE — 86592 SYPHILIS TEST NON-TREP QUAL: CPT

## 2019-02-05 PROCEDURE — 99395 PREV VISIT EST AGE 18-39: CPT | Mod: S$PBB,,, | Performed by: INTERNAL MEDICINE

## 2019-02-05 PROCEDURE — 36415 COLL VENOUS BLD VENIPUNCTURE: CPT | Mod: PO

## 2019-02-05 PROCEDURE — 86703 HIV-1/HIV-2 1 RESULT ANTBDY: CPT

## 2019-02-05 PROCEDURE — 99999 PR PBB SHADOW E&M-EST. PATIENT-LVL III: CPT | Mod: PBBFAC,,, | Performed by: INTERNAL MEDICINE

## 2019-02-05 PROCEDURE — 86706 HEP B SURFACE ANTIBODY: CPT

## 2019-02-05 PROCEDURE — 86704 HEP B CORE ANTIBODY TOTAL: CPT

## 2019-02-05 PROCEDURE — 99213 OFFICE O/P EST LOW 20 MIN: CPT | Mod: PBBFAC,PO | Performed by: INTERNAL MEDICINE

## 2019-02-05 PROCEDURE — 99999 PR PBB SHADOW E&M-EST. PATIENT-LVL III: ICD-10-PCS | Mod: PBBFAC,,, | Performed by: INTERNAL MEDICINE

## 2019-02-05 RX ORDER — EMTRICITABINE AND TENOFOVIR DISOPROXIL FUMARATE 200; 300 MG/1; MG/1
1 TABLET, FILM COATED ORAL DAILY
Qty: 90 TABLET | Refills: 0 | Status: SHIPPED | OUTPATIENT
Start: 2019-02-05 | End: 2019-05-07 | Stop reason: SDUPTHER

## 2019-02-06 LAB
HBV CORE AB SERPL QL IA: NEGATIVE
HBV SURFACE AB SER-ACNC: POSITIVE M[IU]/ML
HCV AB SERPL QL IA: NEGATIVE
HIV 1+2 AB+HIV1 P24 AG SERPL QL IA: NEGATIVE
RPR SER QL: NORMAL

## 2019-02-06 NOTE — PROGRESS NOTES
SUBJECTIVE:    This is a/an 23 y.o. male here for primary care visit for  Chief Complaint   Patient presents with    Follow-up       General Sexual Risk Evaluation  · Patient Clinic Questionnaire results reviewed Yes    FOLLOW UP EVALUATION  - Symptoms of acute HIV disease past 4 weeks: 3 or more weeks ago, acute fever with pharyngitis. Improved. No clear high risk exposure to HIV   - PrEP side effects: no   - Significant adherence lapses: yes. Stopped months ago  - Started any new over the counter medicines or dietary supplements: no     STD History  Since last visit have you experienced   · Genital soreness, ulcers/wounds, or painful urination?  No  · Rash on the palms or soles? No  · Treated for STI elsewhere since last visit?  perhaps onset of symptoms was around January 14th.  He was evaluated at an outside urgent care that following Friday and received a Rocephin injection.  Prior to this visit he had already started clarithromycin from ENT.  Clarithromycin coincided with the Rocephin injection.  It was not until he received the Rocephin injection along with a corticosteroid injection that his sore throat improved.  He only received a streptococcal test which was negative at Urgent Care.  ENT who saw him very soon after the onset of the pharyngitis did not provide any diagnostic testing.    Sexual Partner Evaluation  · Discussed details of HIV/STI risk related to sexual partner characteristics Yes   · Primary male partner was mutually monogamous but he has a new partner since January of 2018 and the old relationship has ended.  The new partner is also on HIV prep.  He is engaged in care and has a recent negative HIV test.  · No rectal exposure.  Exclusive insertive penile behavior.    Condom Use  · Discussed details relating to condom use and attitudes  Deferred      Substance History  · Discussed relevant details relating to substance use that might increase risk for HIV acquisition Deferred      Medications  Reviewed and Updated    Past medical, family, and social histories were reviewed and updated.    Review of Systems negative unless noted otherwise in history of present illness-  ROS    Allergic:  Review of patient's allergies indicates:  No Known Allergies    OBJECTIVE:  BP: 114/86 Pulse: 104    Wt Readings from Last 3 Encounters:   02/05/19 64.5 kg (142 lb 3.2 oz)   10/10/18 64.9 kg (143 lb 1.3 oz)   07/10/18 59.9 kg (132 lb 0.9 oz)    Body mass index is 19.83 kg/m².  Previous Blood Pressure Readings :   BP Readings from Last 3 Encounters:   02/05/19 114/86   10/10/18 122/88   07/10/18 120/84       Physical Exam    GEN: No apparent distress  HEENT: oropharynx clear, no cervical lymphadenopathy .  Prominent tonsillar tissue bilaterally. No lesions.  CV: no peripheral edema  PULM: breathing non-labored  NEURO: cn ii-xii grossly intact, normal gait   SKIN: no visible rashes on the palms or exposed extremities  : Deferred      Pertinent Labs Reviewed       ASSESSMENT/PLAN:    ASSESSMENT  - HIV PrEP compliance reviewed today and deemed to be Inadequate  - reviewed patient home medications for new interactions with Truvada PreP  yes    PLAN  - temporary suspension of HIV PreP indicated  plan to resume  - intensive compliance counseling indicated  no  - repeat HIV testing indicated (routine q1-3 month) yes   - repeat renal lab indicated no   - repeat HCV Ab testing indicated yes   - repeat Hep B serology testing indicated yes     STI Management Plan  - patient was screened for STI signs and symptoms today  - appropriate action will be taken based on today's findings.   PLAN   - patient counseled on safer sex methods today  - HAV/HBV/HPV vax reviewed for completeness yes    Future Appointments   Date Time Provider Department [facility]   5/6/2019 11:00 AM [facility]  2/5/2019  6:55 PM

## 2019-02-07 ENCOUNTER — TELEPHONE (OUTPATIENT)
Dept: PHARMACY | Facility: CLINIC | Age: 24
End: 2019-02-07

## 2019-02-11 ENCOUNTER — TELEPHONE (OUTPATIENT)
Dept: PHARMACY | Facility: CLINIC | Age: 24
End: 2019-02-11

## 2019-02-11 NOTE — TELEPHONE ENCOUNTER
Patient called for restart on Truvada.  No answer - lvm for call back.     KIRSTEN Orantes.Ph., AAHIVP  Clinical Pharmacist, HIV/HCV  [facility]  Phone: [phone]

## 2019-02-13 ENCOUNTER — PATIENT MESSAGE (OUTPATIENT)
Dept: ADMINISTRATIVE | Facility: OTHER | Age: 24
End: 2019-02-13

## 2019-02-18 NOTE — TELEPHONE ENCOUNTER
Patient called for refill readiness and follow up on Truvada.  No answer - lvm for call back.     Mark Neff, KIRSTEN.Ph., AAHIVP  Clinical Pharmacist, HIV/HCV  [facility]  Phone: [phone]

## 2019-02-18 NOTE — TELEPHONE ENCOUNTER
Patient called to pharmacy.  He wishes to come in person and  Truvada for PrEP in person.  This is to be done on or after 19. Patient is currently recovering from influenza.  He has been cleared to return to normal activities and was including picking up and starting PrEP as part of same.     Patient is restarting Truvada as PrEP after taking a break from it since 2018. Patient denies having had any issues, side effects, or complaints with it when he was on it previously.  The following was reinforced:    Patient plans to start Truvada on .  Truvada counseling complete. Name/ confirmed. Consult included: indication; goals of treatment; administration; storage and handling; side effects; how to handle missed doses; the importance of compliance; the importance of maintaining lab appts and follow up appts w\ MD; safe sex practices with condoms. Patient understands that medication alone is not primary HIV prophylaxis. Patient understands this medication will only serve as protection for HIV and no other STDs. Patient understands to contact [facility] and MD for any medication changes due to drug interactions. He understands the importance of 3month regular testing. He understands the importance of ONCE daily dosing due to the potential renal effects of tenofovir. All questions answered and addressed to the patients satisfaction.       Discussed the importance of staying well hydrated while on therapy. Compliance stressed - patient to take missed doses as soon as remembered, but NOT to take 2 doses in one day. Patient will report questions or concerns to myself or practitioner. Patient verbalizes understanding. I will personally f/u with patient in 2 weeks from start, and [facility] will contact patient in 3 weeks to coordinate next refill.        Patient plans to start Truvada on . (please schedule labs accordingly)  Approximately 15 mins spent with the patient on medication  education.      Thank you for allowing us to participate in the care of your patient     Mark Neff, KIRSTEN.Ph., AAHIVP  Clinical Pharmacist, HIV/HCV  [facility]  Phone: [phone]

## 2019-03-01 ENCOUNTER — PATIENT MESSAGE (OUTPATIENT)
Dept: ADMINISTRATIVE | Facility: OTHER | Age: 24
End: 2019-03-01

## 2019-03-12 ENCOUNTER — PATIENT MESSAGE (OUTPATIENT)
Dept: INTERNAL MEDICINE | Facility: CLINIC | Age: 24
End: 2019-03-12

## 2019-03-18 ENCOUNTER — PATIENT MESSAGE (OUTPATIENT)
Dept: ADMINISTRATIVE | Facility: OTHER | Age: 24
End: 2019-03-18

## 2019-05-06 ENCOUNTER — LAB VISIT (OUTPATIENT)
Dept: LAB | Facility: HOSPITAL | Age: 24
End: 2019-05-06
Attending: INTERNAL MEDICINE
Payer: MEDICAID

## 2019-05-06 DIAGNOSIS — Z11.3 SCREENING EXAMINATION FOR STD (SEXUALLY TRANSMITTED DISEASE): ICD-10-CM

## 2019-05-06 DIAGNOSIS — Z20.6 CONTACT WITH AND (SUSPECTED) EXPOSURE TO HUMAN IMMUNODEFICIENCY VIRUS (HIV): ICD-10-CM

## 2019-05-06 PROCEDURE — 86592 SYPHILIS TEST NON-TREP QUAL: CPT

## 2019-05-06 PROCEDURE — 36415 COLL VENOUS BLD VENIPUNCTURE: CPT | Mod: PO

## 2019-05-06 PROCEDURE — 86703 HIV-1/HIV-2 1 RESULT ANTBDY: CPT

## 2019-05-07 ENCOUNTER — TELEPHONE (OUTPATIENT)
Dept: PHARMACY | Facility: CLINIC | Age: 24
End: 2019-05-07

## 2019-05-07 DIAGNOSIS — Z20.6 CONTACT WITH AND (SUSPECTED) EXPOSURE TO HUMAN IMMUNODEFICIENCY VIRUS (HIV): ICD-10-CM

## 2019-05-07 LAB
HIV 1+2 AB+HIV1 P24 AG SERPL QL IA: NEGATIVE
RPR SER QL: NORMAL

## 2019-05-07 RX ORDER — EMTRICITABINE AND TENOFOVIR DISOPROXIL FUMARATE 200; 300 MG/1; MG/1
1 TABLET, FILM COATED ORAL DAILY
Qty: 90 TABLET | Refills: 0 | Status: SHIPPED | OUTPATIENT
Start: 2019-05-07 | End: 2019-08-15

## 2019-05-07 NOTE — TELEPHONE ENCOUNTER
Patient called for consult and ship on restart of Truvada as PrEP after a break of approx 2 months.  [facility]    KIRSTEN Orantes.Ph., AAHIVP  Clinical Pharmacist, HIV/HCV  [facility]  Phone: [phone]

## 2019-05-07 NOTE — TELEPHONE ENCOUNTER
Informed patient that [facility] received prescription for Truvada and benefits investigation is required.  [facility] will be back in touch once insurance determination is received.  Patient confirms that he does have our number saved so we can get him restarted ASAP

## 2019-05-09 ENCOUNTER — TELEPHONE (OUTPATIENT)
Dept: PHARMACY | Facility: CLINIC | Age: 24
End: 2019-05-09

## 2019-05-09 NOTE — TELEPHONE ENCOUNTER
Attempted to contact patient for initial consult Truvada.  Unable to contact patient - [facility].  [email or web address] message sent to patient.

## 2019-05-10 NOTE — TELEPHONE ENCOUNTER
Documentation Only:     Prior Authorization for Truvada IS NOT required     Patient co-pay: $0     Patient Assistance IS NOT required.    Forwarding to clinical pharmacist for consult and shipment.    AKF 2:41pm

## 2019-05-14 NOTE — TELEPHONE ENCOUNTER
Patient called for refill readiness and follow up on Truvada as PrEP.  No answer - lvm for call back.     KIRSTEN Orantes.Ph., AAHIVP  Clinical Pharmacist, HIV/HCV  [facility]  Phone: [phone]

## 2019-05-17 NOTE — TELEPHONE ENCOUNTER
Truvada refill confirmed with father, Sr Alcides (restart). Truvada refill was picked up from [facility] on . $0.00 copay- 004. Confirmed 2 patient identifiers - name and .     Consultation declined by father due to Alcides being on medication previously. All questions answered and addressed to patients satisfaction. Pt verbalized understanding.  Aware [facility] will f/u for medication refills when he has 7-days of medication on hand.

## 2019-05-21 ENCOUNTER — PATIENT MESSAGE (OUTPATIENT)
Dept: FAMILY MEDICINE | Facility: CLINIC | Age: 24
End: 2019-05-21

## 2019-05-27 ENCOUNTER — OFFICE VISIT (OUTPATIENT)
Dept: INTERNAL MEDICINE | Facility: CLINIC | Age: 24
End: 2019-05-27
Payer: MEDICAID

## 2019-05-27 VITALS
WEIGHT: 152.31 LBS | SYSTOLIC BLOOD PRESSURE: 118 MMHG | OXYGEN SATURATION: 99 % | BODY MASS INDEX: 21.32 KG/M2 | DIASTOLIC BLOOD PRESSURE: 76 MMHG | HEIGHT: 71 IN | HEART RATE: 96 BPM

## 2019-05-27 DIAGNOSIS — Z20.6 CONTACT WITH AND (SUSPECTED) EXPOSURE TO HUMAN IMMUNODEFICIENCY VIRUS (HIV): Primary | ICD-10-CM

## 2019-05-27 DIAGNOSIS — Z11.59 NEED FOR HEPATITIS B SCREENING TEST: ICD-10-CM

## 2019-05-27 DIAGNOSIS — Z11.3 SCREEN FOR STD (SEXUALLY TRANSMITTED DISEASE): ICD-10-CM

## 2019-05-27 PROCEDURE — 99999 PR PBB SHADOW E&M-EST. PATIENT-LVL IV: CPT | Mod: PBBFAC,,, | Performed by: INTERNAL MEDICINE

## 2019-05-27 PROCEDURE — 99214 OFFICE O/P EST MOD 30 MIN: CPT | Mod: PBBFAC,PO | Performed by: INTERNAL MEDICINE

## 2019-05-27 PROCEDURE — 99213 OFFICE O/P EST LOW 20 MIN: CPT | Mod: S$PBB,,, | Performed by: INTERNAL MEDICINE

## 2019-05-27 PROCEDURE — 99999 PR PBB SHADOW E&M-EST. PATIENT-LVL IV: ICD-10-PCS | Mod: PBBFAC,,, | Performed by: INTERNAL MEDICINE

## 2019-05-27 PROCEDURE — 99213 PR OFFICE/OUTPT VISIT, EST, LEVL III, 20-29 MIN: ICD-10-PCS | Mod: S$PBB,,, | Performed by: INTERNAL MEDICINE

## 2019-05-27 NOTE — PATIENT INSTRUCTIONS
"Recommendations for today  Should you have a change in medical insurance coverage please let us know as soon as possible so that we can work with specialty pharmacy to keep the cost down on [facility]        Sexual Health Services   Flexible screening for sexual health concerns such as HIV, gonorrhea, chlamydia, syphilis and others are available at this clinic. However, you first need to talk to a member of my clinic staff so we can prepare the necessary screening materials and arrange an appropriate date and time for your visit.     How to Set up Screening  Simply call the office ahead of time and inform my medical assistant that you would like STD testing. If you have received specific information about a specific possible STD exposure (e.g syphilis) please also mention this to my assistant.      When to Consider Extra Screening  The following circumstances may warrant additional testing to help promote sexual health.    · After a casual sexual encounter especially if a condom was not used for oral/anal/vaginal sex. Even if you are on PrEP, it does not provide protection against syphilis, gonorhea, chlamydia and other STDs.     · Even if you are starting a monogamous ("closed") relationship, it's better to be sure that neither of you have a silent infection before things get sexual. Even if your partner has been tested, not all medical offices offer the right STD testing so to remove any doubts, consider scheduling a visit and ask us for assistance to schedule your new partner for a comprehensive sexual health visit.     · If you have any of the following symptoms including burning with urination, the strong desire to urinate more frequently than usual, rectal pain or discomfort, an unusual skin rash especially if on the palms or soles, ulcer or sore in the throat or around the genitals, an unusual sore throat, fever, or swelling of the glands in the neck or groin.          "

## 2019-05-27 NOTE — PROGRESS NOTES
"HIV PrEP FOLLOW UP VISIT CONSULT NOTE    SUBJECTIVE:    This is a/an 23 y.o. male here for primary care visit for  Chief Complaint   Patient presents with    Prep     follow up        General Sexual Behavior Evaluation  · Patient Clinic Questionnaire results reviewed today and scanned into chart "media" tab for review  · Insertive anal sex is the only penetrative sex for this patient.       STD History  · Detailed HIV/STI risk reduction counseling completed today Deferred      Condom Use  · Detailed counseling relating to condom use completed today patient use is consistent. Insertive anal sex      Substance History  · Detailed counseling relating to substance use to decrease HIV/STI risk completed today Deferred    Answers for HPI/ROS submitted by the patient on 5/27/2019   activity change: No  unexpected weight change: No  rhinorrhea: No  trouble swallowing: No  visual disturbance: No  chest tightness: No  polyuria: No  difficulty urinating: No  joint swelling: No  arthralgias: No  confusion: No  dysphoric mood: No      Medications Reviewed and Updated    Past medical, family, and social histories were reviewed and updated.    Review of Systems negative unless noted otherwise in history of present illness-  Review of Systems   Constitutional: Negative for malaise/fatigue.   HENT: Negative for hearing loss.    Eyes: Negative for discharge.   Respiratory: Negative for wheezing.    Cardiovascular: Negative for chest pain and palpitations.   Gastrointestinal: Negative for blood in stool, constipation, diarrhea and vomiting.   Genitourinary: Negative for hematuria and urgency.   Musculoskeletal: Negative for neck pain.   Neurological: Negative for weakness and headaches.   Endo/Heme/Allergies: Negative for polydipsia.       Allergic:  Review of patient's allergies indicates:  No Known Allergies    OBJECTIVE:  BP: 118/76 Pulse: 96    Wt Readings from Last 3 Encounters:   05/27/19 69.1 kg (152 lb 5.4 oz)   02/05/19 64.5 " kg (142 lb 3.2 oz)   10/10/18 64.9 kg (143 lb 1.3 oz)    Body mass index is 21.25 kg/m².  Previous Blood Pressure Readings :   BP Readings from Last 3 Encounters:   05/27/19 118/76   02/05/19 114/86   10/10/18 122/88       Physical Exam    GEN: No apparent distress  HEENT: oropharynx clear, no cervical lymphadenopathy    CV: no peripheral edema  PULM: breathing non-labored  NEURO: cn ii-xii grossly intact, normal gait   SKIN: no visible rashes on the palms or exposed extremities  : no bilateral inguinal lymphadenitis      Pertinent Labs Reviewed       ASSESSMENT/PLAN:    ASSESSMENT  - HIV PrEP compliance reviewed today and deemed to be Adequate  - reviewed patient home medications for new interactions with Truvada PreP     PLAN  - temporary suspension of HIV PreP indicated  no  - intensive compliance counseling indicated  no  - repeat HIV labs in 3 months will be scheduled today   - need for repeat renal labs (sCR, UA) reviewed today. Labs indicated today no   - repeat HCV Ab testing indicated today no   - repeat Hep B serology testing indicated surface antigen would be helpful     STI Management Plan  - patient was screened for STI signs and symptoms today  - appropriate action will be taken based on today's findings.   PLAN   - patient counseled on safer sex methods today  - HAV/HBV/HPV vax reviewed for completeness HPV may still be due    HIV PrEP Patient    Need for hepatitis B screening test  -     Hepatitis B surface antigen; Future; Expected date: 05/27/2019    Screen for STD (sexually transmitted disease)        Future Appointments   Date Time Provider Department Center   5/27/2019 12:00 PM SPE[facility]IM[facility]   5/27/2019 12:15 PM [facility], [place] [facility] [place]   8/8/2019  2:00 PM LAB, [facility] LAB [place]   8/15/2019  2:00 PM Nikhil Rodgers MD [facility] [place]       Nikhil Rodgers  5/27/2019  11:57 AM

## 2019-05-31 ENCOUNTER — PATIENT MESSAGE (OUTPATIENT)
Dept: INTERNAL MEDICINE | Facility: CLINIC | Age: 24
End: 2019-05-31

## 2019-06-04 ENCOUNTER — PATIENT MESSAGE (OUTPATIENT)
Dept: INTERNAL MEDICINE | Facility: CLINIC | Age: 24
End: 2019-06-04

## 2019-06-07 ENCOUNTER — TELEPHONE (OUTPATIENT)
Dept: PHARMACY | Facility: CLINIC | Age: 24
End: 2019-06-07

## 2019-06-07 NOTE — TELEPHONE ENCOUNTER
Attempted to contact patient for refill/follow up Truvada.  Unable to contact patient - LVM.  Akosh[place] message sent to patient.

## 2019-06-11 NOTE — TELEPHONE ENCOUNTER
Truvada refill confirmed.  Patient plans on picking up Truvada by .   $0 copay- 004. Confirmed 2 patient identifiers - name and .      Patient reports taking Truvada routinely and has about 14 doses on hand.  Patient reports having surplus due to having a previous prescription on hand, but would like to pickup refill. Patient reports some headaches since starting Truvada; patient message provider and started taking Tylenol to help relieve headaches.  Patient also advised to drink plenty of water to help with headaches.  No missed doses, no new medications, no new allergies or health conditions reported at this time. All questions answered and addressed to patients satisfaction. Pt verbalized understanding.

## 2019-08-05 ENCOUNTER — TELEPHONE (OUTPATIENT)
Dept: PHARMACY | Facility: CLINIC | Age: 24
End: 2019-08-05

## 2019-08-15 ENCOUNTER — LAB VISIT (OUTPATIENT)
Dept: LAB | Facility: HOSPITAL | Age: 24
End: 2019-08-15
Attending: INTERNAL MEDICINE
Payer: MEDICAID

## 2019-08-15 ENCOUNTER — OFFICE VISIT (OUTPATIENT)
Dept: INTERNAL MEDICINE | Facility: CLINIC | Age: 24
End: 2019-08-15
Payer: MEDICAID

## 2019-08-15 VITALS
BODY MASS INDEX: 21.02 KG/M2 | HEIGHT: 71 IN | DIASTOLIC BLOOD PRESSURE: 74 MMHG | WEIGHT: 150.13 LBS | SYSTOLIC BLOOD PRESSURE: 110 MMHG | HEART RATE: 90 BPM | OXYGEN SATURATION: 98 %

## 2019-08-15 DIAGNOSIS — Z20.6 CONTACT WITH AND (SUSPECTED) EXPOSURE TO HUMAN IMMUNODEFICIENCY VIRUS (HIV): ICD-10-CM

## 2019-08-15 DIAGNOSIS — Z11.3 SCREENING EXAMINATION FOR STD (SEXUALLY TRANSMITTED DISEASE): ICD-10-CM

## 2019-08-15 DIAGNOSIS — Z11.59 NEED FOR HEPATITIS B SCREENING TEST: ICD-10-CM

## 2019-08-15 PROCEDURE — 86703 HIV-1/HIV-2 1 RESULT ANTBDY: CPT

## 2019-08-15 PROCEDURE — 99999 PR PBB SHADOW E&M-EST. PATIENT-LVL III: CPT | Mod: PBBFAC,,, | Performed by: INTERNAL MEDICINE

## 2019-08-15 PROCEDURE — 99401 PREV MED CNSL INDIV APPRX 15: CPT | Mod: S$PBB,,, | Performed by: INTERNAL MEDICINE

## 2019-08-15 PROCEDURE — 87340 HEPATITIS B SURFACE AG IA: CPT

## 2019-08-15 PROCEDURE — 99213 OFFICE O/P EST LOW 20 MIN: CPT | Mod: PBBFAC,PO | Performed by: INTERNAL MEDICINE

## 2019-08-15 PROCEDURE — 36415 COLL VENOUS BLD VENIPUNCTURE: CPT | Mod: PO

## 2019-08-15 PROCEDURE — 86592 SYPHILIS TEST NON-TREP QUAL: CPT

## 2019-08-15 PROCEDURE — 99999 PR PBB SHADOW E&M-EST. PATIENT-LVL III: ICD-10-PCS | Mod: PBBFAC,,, | Performed by: INTERNAL MEDICINE

## 2019-08-15 PROCEDURE — 99401 PR PREVENT COUNSEL,INDIV,15 MIN: ICD-10-PCS | Mod: S$PBB,,, | Performed by: INTERNAL MEDICINE

## 2019-08-15 RX ORDER — EMTRICITABINE AND TENOFOVIR DISOPROXIL FUMARATE 200; 300 MG/1; MG/1
1 TABLET, FILM COATED ORAL DAILY
Qty: 90 TABLET | Refills: 0 | Status: SHIPPED | OUTPATIENT
Start: 2019-08-15 | End: 2019-10-16 | Stop reason: SINTOL

## 2019-08-16 LAB
HBV SURFACE AG SERPL QL IA: NEGATIVE
HIV 1+2 AB+HIV1 P24 AG SERPL QL IA: NEGATIVE
RPR SER QL: NORMAL

## 2019-08-18 NOTE — PROGRESS NOTES
"HIV PrEP FOLLOW UP VISIT CONSULT NOTE    SUBJECTIVE:    This is a/an 24 y.o. male here for primary care visit for  Chief Complaint   Patient presents with    Results       General Sexual Behavior Evaluation  No flowsheet data found.  If above is blank, patient Clinic Questionnaire results reviewed today and scanned into chart "media" tab for review      STD History  · Detailed HIV/STI risk reduction counseling completed today Yes      Condom Use  · Detailed counseling relating to condom use completed today Yes      Substance History  · Detailed counseling relating to substance use to decrease HIV/STI risk completed today Deferred        Medications Reviewed and Updated    Past medical, family, and social histories were reviewed and updated.    Review of Systems negative unless noted otherwise in history of present illness-  ROS    Allergic:  Review of patient's allergies indicates:  No Known Allergies    OBJECTIVE:  BP: 110/74 Pulse: 90    Wt Readings from Last 3 Encounters:   08/15/19 68.1 kg (150 lb 2.1 oz)   05/27/19 69.1 kg (152 lb 5.4 oz)   02/05/19 64.5 kg (142 lb 3.2 oz)    Body mass index is 20.94 kg/m².  Previous Blood Pressure Readings :   BP Readings from Last 3 Encounters:   08/15/19 110/74   05/27/19 118/76   02/05/19 114/86       Physical Exam    GEN: No apparent distress  HEENT: oropharynx clear, no cervical lymphadenopathy    CV: no peripheral edema  PULM: breathing non-labored  NEURO: cn ii-xii grossly intact, normal gait   SKIN: no visible rashes on the palms or exposed extremities  : Deferred      Pertinent Labs Reviewed       ASSESSMENT/PLAN:    ASSESSMENT  - HIV PrEP compliance reviewed today and deemed to be Adequate  - reviewed patient home medications for new interactions with Truvada PreP     PLAN  - temporary suspension of HIV PreP indicated  no  - intensive compliance counseling indicated  no  - repeat HIV labs in 3 months will be scheduled today   - need for repeat renal labs (sCR, UA) " reviewed today. Labs indicated today no   - repeat HCV Ab testing indicated today no   - repeat Hep B serology testing indicated no     STI Management Plan  - patient was screened for STI signs and symptoms today  - appropriate action will be taken based on today's findings.   PLAN   - patient counseled on safer sex methods today  - HAV/HBV/HPV vax reviewed for completeness yes    HIV PrEP Patient  -     emtricitabine-tenofovir 200-300 mg (TRUVADA) 200-300 mg Tab; Take 1 tablet by mouth once daily. For HIV PrEP. Hold Rx for negative HIV test. Refill requires repeat HIV test in 3 months  Dispense: 90 tablet; Refill: 0        Future Appointments   Date Time Provider Department [place]   11/15/2019  9:45 AM LAB, [facility] [place]   11/20/2019  3:00 PM Nikhil Rodgers MD [facility]  8/18/2019  4:30 PM

## 2019-09-03 NOTE — TELEPHONE ENCOUNTER
Patient called for follow up on Truvada as PrEP.  No answer - lvm for call back.     Deanne Kurtz, Pharm.D.  Pharmacy Resident, PGY-1   [facility]

## 2019-09-10 ENCOUNTER — PATIENT MESSAGE (OUTPATIENT)
Dept: ADMINISTRATIVE | Facility: OTHER | Age: 24
End: 2019-09-10

## 2019-10-07 ENCOUNTER — TELEPHONE (OUTPATIENT)
Dept: PHARMACY | Facility: CLINIC | Age: 24
End: 2019-10-07

## 2019-10-07 NOTE — TELEPHONE ENCOUNTER
Pt's truvada is refill too soon until 10/8. Will call tomorrow for refill and follow up.    Deanne Kurtz, Pharm.D.  Pharmacy Resident, PGY-1   [facility]

## 2019-10-08 NOTE — TELEPHONE ENCOUNTER
Patient called for refill readiness and follow up on Truvada.  No answer - lvm for call back.     Mark Neff, KIRSTEN.Ph., AAHIVP  Clinical Pharmacist, HIV/HCV  [facility]  Phone: [phone]

## 2019-10-14 NOTE — TELEPHONE ENCOUNTER
Clinical follow-up conducted for Truvada. Name/ confirmed. no missed doses; no new medications; patient complains of feeling nauseous often, even when he takes truvada with a meal. He expressed wanting to switch from truvada to descovy as PrEP since it has been recently approved. He will reach out to Dr. Rodgers and discuss with him his symptoms. He did not want truvada filled at this time and said he has a week left. [facility] will continue to monitor. Patient understands to report any medication changes to [facility] and provider. All questions answered and addressed to patients satisfaction.      Deanne Kurtz, Pharm.D.  Pharmacy Resident, PGY-1   [facility]

## 2019-10-15 ENCOUNTER — TELEPHONE (OUTPATIENT)
Dept: INTERNAL MEDICINE | Facility: CLINIC | Age: 24
End: 2019-10-15

## 2019-10-15 NOTE — TELEPHONE ENCOUNTER
Left message informing patient appt scheduled for tomorrow at 3 pm. Instructed to call office if unable to keep appt.

## 2019-10-15 NOTE — TELEPHONE ENCOUNTER
----- Message from Nikhil Rodgers MD sent at 10/15/2019  5:56 PM CDT -----  Patient is having some abdominal issues.  I would like him to see me before we consider a specialist.  Offer him a [facility] appointment if you cannot find something soon enough here

## 2019-10-16 ENCOUNTER — LAB VISIT (OUTPATIENT)
Dept: LAB | Facility: HOSPITAL | Age: 24
End: 2019-10-16
Attending: INTERNAL MEDICINE
Payer: MEDICAID

## 2019-10-16 ENCOUNTER — OFFICE VISIT (OUTPATIENT)
Dept: INTERNAL MEDICINE | Facility: CLINIC | Age: 24
End: 2019-10-16
Payer: MEDICAID

## 2019-10-16 VITALS
BODY MASS INDEX: 20.34 KG/M2 | HEIGHT: 71 IN | OXYGEN SATURATION: 98 % | WEIGHT: 145.31 LBS | HEART RATE: 101 BPM | DIASTOLIC BLOOD PRESSURE: 80 MMHG | SYSTOLIC BLOOD PRESSURE: 116 MMHG

## 2019-10-16 DIAGNOSIS — Z20.6 CONTACT WITH AND (SUSPECTED) EXPOSURE TO HUMAN IMMUNODEFICIENCY VIRUS (HIV): Primary | ICD-10-CM

## 2019-10-16 DIAGNOSIS — Z20.6 CONTACT WITH AND (SUSPECTED) EXPOSURE TO HUMAN IMMUNODEFICIENCY VIRUS (HIV): ICD-10-CM

## 2019-10-16 DIAGNOSIS — K58.0 IRRITABLE BOWEL SYNDROME WITH DIARRHEA: ICD-10-CM

## 2019-10-16 LAB
ALBUMIN SERPL BCP-MCNC: 4.5 G/DL (ref 3.5–5.2)
ALP SERPL-CCNC: 85 U/L (ref 55–135)
ALT SERPL W/O P-5'-P-CCNC: 20 U/L (ref 10–44)
ANION GAP SERPL CALC-SCNC: 11 MMOL/L (ref 8–16)
AST SERPL-CCNC: 24 U/L (ref 10–40)
BILIRUB SERPL-MCNC: 1 MG/DL (ref 0.1–1)
BUN SERPL-MCNC: 10 MG/DL (ref 6–20)
CALCIUM SERPL-MCNC: 9.9 MG/DL (ref 8.7–10.5)
CHLORIDE SERPL-SCNC: 103 MMOL/L (ref 95–110)
CO2 SERPL-SCNC: 28 MMOL/L (ref 23–29)
CREAT SERPL-MCNC: 0.8 MG/DL (ref 0.5–1.4)
EST. GFR  (AFRICAN AMERICAN): >60 ML/MIN/1.73 M^2
EST. GFR  (NON AFRICAN AMERICAN): >60 ML/MIN/1.73 M^2
GLUCOSE SERPL-MCNC: 80 MG/DL (ref 70–110)
POTASSIUM SERPL-SCNC: 4.2 MMOL/L (ref 3.5–5.1)
PROT SERPL-MCNC: 7.5 G/DL (ref 6–8.4)
SODIUM SERPL-SCNC: 142 MMOL/L (ref 136–145)

## 2019-10-16 PROCEDURE — 36415 COLL VENOUS BLD VENIPUNCTURE: CPT | Mod: PO

## 2019-10-16 PROCEDURE — 99999 PR PBB SHADOW E&M-EST. PATIENT-LVL III: CPT | Mod: PBBFAC,,, | Performed by: INTERNAL MEDICINE

## 2019-10-16 PROCEDURE — 80053 COMPREHEN METABOLIC PANEL: CPT

## 2019-10-16 PROCEDURE — 99999 PR PBB SHADOW E&M-EST. PATIENT-LVL III: ICD-10-PCS | Mod: PBBFAC,,, | Performed by: INTERNAL MEDICINE

## 2019-10-16 PROCEDURE — 99213 PR OFFICE/OUTPT VISIT, EST, LEVL III, 20-29 MIN: ICD-10-PCS | Mod: S$PBB,,, | Performed by: INTERNAL MEDICINE

## 2019-10-16 PROCEDURE — 99213 OFFICE O/P EST LOW 20 MIN: CPT | Mod: S$PBB,,, | Performed by: INTERNAL MEDICINE

## 2019-10-16 PROCEDURE — 99213 OFFICE O/P EST LOW 20 MIN: CPT | Mod: PBBFAC,PO | Performed by: INTERNAL MEDICINE

## 2019-10-17 ENCOUNTER — TELEPHONE (OUTPATIENT)
Dept: PHARMACY | Facility: CLINIC | Age: 24
End: 2019-10-17

## 2019-10-17 NOTE — TELEPHONE ENCOUNTER
Informed Patient  that [facility] received prescription for Descovy and benefits investigation is required.  [facility] will be back in touch once insurance determination is received.

## 2019-10-20 NOTE — PROGRESS NOTES
Portions of this note are generated with voice recognition software. Typographical errors may exist.     SUBJECTIVE:    This is a/an 24 y.o. male here for primary care visit for  Chief Complaint   Patient presents with    Follow-up     Patient remarks that consistently when taking Truvada in the evening he will have frequent bowel movements in the morning.  They tend to be soft sometimes watery.  2-3 episodes.  He tolerated this as a baseline but for some reason in the past month bowel urgency and diarrhea has worsened.  Non bloody bowel movements.  Not associated with abdominal pain. Patient discontinue Truvada and symptoms completely resolved.  Patient is concerned that he will not be able to tolerate Truvada for HIV prep if this continues to be a intolerance.  Gallbladder intact. History of IBS with diarrhea.      Medications Reviewed and Updated    Past medical, family, and social histories were reviewed and updated.    Review of Systems negative unless otherwise noted in history of present illness-  ROS    General ROS: negative  Psychological ROS: negative  ENT ROS: negative  Endocrine ROS: Negative  Allergy and Immunology ROS: negative  Cardiovascular ROS: negative  Pulmonary ROS: Negative  Gastrointestinal ROS: negative  Genito-Urinary ROS: negative  Musculoskeletal ROS: negative        Allergic:    Review of patient's allergies indicates:   Allergen Reactions    Truvada [emtricitabine-tenofovir (tdf)]      diarrhea       OBJECTIVE:  BP: 116/80 Pulse: 101    Wt Readings from Last 3 Encounters:   10/16/19 65.9 kg (145 lb 4.5 oz)   08/15/19 68.1 kg (150 lb 2.1 oz)   05/27/19 69.1 kg (152 lb 5.4 oz)    Body mass index is 20.26 kg/m².  Previous Blood Pressure Readings :   BP Readings from Last 3 Encounters:   10/16/19 116/80   08/15/19 110/74   05/27/19 118/76       Physical Exam    GEN: No apparent distress  HEENT: sclera non-icteric, conjunctiva clear  CV: no peripheral edema  PULM: breathing  non-labored  ABD: non, protuberant abdomen.  Negative Jameson sign.  Normoactive bowel sounds. Supple abdomen all quadrants  PSYCH: appropriate affect  MSK: able to rise from chair without assistance  SKIN: normal skin turgor    Pertinent Labs Reviewed       ASSESSMENT/PLAN:    HIV PrEP Patient.Condition not optimally controlled. Detailed counseling on self care measures. Plan to monitor clinically in addition to plan below or as listed on After Visit Summary.   -     emtricitabine-tenofovir alafen (DESCOVY) 200-25 mg Tab; Take 1 tablet by mouth once daily.  Dispense: 90 tablet; Refill: 0  -     Comprehensive metabolic panel; Standing    Irritable bowel syndrome with diarrhea.Condition not optimally controlled. Detailed counseling on self care measures. Plan to monitor clinically in addition to plan below or as listed on After Visit Summary.   -     Comprehensive metabolic panel; Standing          Future Appointments   Date Time Provider Department [place]   11/15/2019  9:45 AM LAB, [facility] [place]   11/20/2019  3:00 PM Nikhil Rodgers MD [facility]  10/20/2019  11:39 AM

## 2019-10-21 NOTE — TELEPHONE ENCOUNTER
Attempted to reach patient for initial Descovy consultation. No answer. Left voicemail for call back. Will follow-up.

## 2019-10-22 NOTE — TELEPHONE ENCOUNTER
Patient returned call to pharmacy for Descovy initial.  Patient is not new to PrEP and had been taking Truvada as PrEP for some time.  However most recently the patient feels that the Truvada was causing significant side effects including GI distress.  He states this became intolerable for him to deal with.  We are changing to Descovy for the purpose of possibly avoiding some of these issues for the patient.  The patient is well versed in PrEP and how it is used having been on truvada.  However the following was reinforced:    Descovy - Take one tablet by mouth once daily.  Counseling was reviewed:   1. Patient MUST take Descovy at the SAME time every day with or without food. Take with food if stomach irritation occurs.   2. Side effects include, but not limited to: nausea   Contact MD if any signs of allergic reaction, kidney problems (weight gain, urine changes, blood in urine), liver problems (dark urine/light stools, jaundice, abdominal pains), depression, bone pain, muscle pain/weakness, change in body fat, and signs of infection.   4. Medication list reviewed. No DDIs or allergies noted. Patient MUST contact myself or provider prior to starting any new OTC, herbal, or prescription drugs to avoid potential DDIs.    -Avoid alcohol     In addition to abstinence, limiting your number of sexual partners, never sharing needles, and using condoms the right way every time you have sex helps prevent the spread of HIV    Discussed the importance of staying well hydrated while on therapy. Compliance stressed - patient to take missed doses as soon as remembered, but NOT to take 2 doses in one day. Patient will report questions or concerns to myself or practitioner. Patient verbalizes understanding. Patient plans to start Descovy on 10/24. Consultation included: indication; goals of treatment; administration; storage and handling; side effects; how to handle side effects; the importance of compliance; how to handle  missed doses; the importance of laboratory monitoring; the importance of keeping all follow up appointments.  Patient understands to report any medication changes to [facility] and provider. All questions answered and addressed to patients satisfaction. I will f/u with patient in 1 week from start, [facility] to contact patient in 3 weeks for refills.     Patient to  Descovy on or after 10/22.    KIRSTEN Orantes.Ph., AAHIVP  Clinical Pharmacist, HIV/HCV  [facility]  Phone: [phone]

## 2019-11-15 ENCOUNTER — LAB VISIT (OUTPATIENT)
Dept: LAB | Facility: HOSPITAL | Age: 24
End: 2019-11-15
Attending: INTERNAL MEDICINE
Payer: MEDICAID

## 2019-11-15 DIAGNOSIS — Z20.6 CONTACT WITH AND (SUSPECTED) EXPOSURE TO HUMAN IMMUNODEFICIENCY VIRUS (HIV): ICD-10-CM

## 2019-11-15 DIAGNOSIS — Z11.3 SCREENING EXAMINATION FOR STD (SEXUALLY TRANSMITTED DISEASE): ICD-10-CM

## 2019-11-15 PROCEDURE — 86592 SYPHILIS TEST NON-TREP QUAL: CPT

## 2019-11-15 PROCEDURE — 36415 COLL VENOUS BLD VENIPUNCTURE: CPT | Mod: PO

## 2019-11-15 PROCEDURE — 86703 HIV-1/HIV-2 1 RESULT ANTBDY: CPT

## 2019-11-16 LAB — RPR SER QL: NORMAL

## 2019-11-18 ENCOUNTER — TELEPHONE (OUTPATIENT)
Dept: PHARMACY | Facility: CLINIC | Age: 24
End: 2019-11-18

## 2019-11-18 ENCOUNTER — PATIENT MESSAGE (OUTPATIENT)
Dept: FAMILY MEDICINE | Facility: CLINIC | Age: 24
End: 2019-11-18

## 2019-11-18 ENCOUNTER — PATIENT MESSAGE (OUTPATIENT)
Dept: INTERNAL MEDICINE | Facility: CLINIC | Age: 24
End: 2019-11-18

## 2019-11-18 LAB — HIV 1+2 AB+HIV1 P24 AG SERPL QL IA: NEGATIVE

## 2019-11-18 NOTE — TELEPHONE ENCOUNTER
Please contact patient to schedule repeat HIV testing early January for prep.  If he has discontinued prep the repeat testing is not necessary at this time.

## 2019-11-20 ENCOUNTER — OFFICE VISIT (OUTPATIENT)
Dept: INTERNAL MEDICINE | Facility: CLINIC | Age: 24
End: 2019-11-20
Payer: MEDICAID

## 2019-11-20 VITALS
BODY MASS INDEX: 20.43 KG/M2 | HEART RATE: 98 BPM | SYSTOLIC BLOOD PRESSURE: 110 MMHG | OXYGEN SATURATION: 99 % | HEIGHT: 71 IN | DIASTOLIC BLOOD PRESSURE: 68 MMHG | WEIGHT: 145.94 LBS

## 2019-11-20 DIAGNOSIS — Z20.6 CONTACT WITH AND (SUSPECTED) EXPOSURE TO HUMAN IMMUNODEFICIENCY VIRUS (HIV): Primary | ICD-10-CM

## 2019-11-20 DIAGNOSIS — Z11.59 ENCOUNTER FOR HEPATITIS C VIRUS SCREENING TEST FOR HIGH RISK PATIENT: ICD-10-CM

## 2019-11-20 DIAGNOSIS — Z91.89 ENCOUNTER FOR HEPATITIS C VIRUS SCREENING TEST FOR HIGH RISK PATIENT: ICD-10-CM

## 2019-11-20 DIAGNOSIS — Z20.2 EXPOSURE TO SEXUALLY TRANSMITTED DISEASE (STD): ICD-10-CM

## 2019-11-20 DIAGNOSIS — E73.9 LACTOSE INTOLERANCE: ICD-10-CM

## 2019-11-20 PROCEDURE — 99213 PR OFFICE/OUTPT VISIT, EST, LEVL III, 20-29 MIN: ICD-10-PCS | Mod: S$PBB,,, | Performed by: INTERNAL MEDICINE

## 2019-11-20 PROCEDURE — 99213 OFFICE O/P EST LOW 20 MIN: CPT | Mod: S$PBB,,, | Performed by: INTERNAL MEDICINE

## 2019-11-20 PROCEDURE — 99214 OFFICE O/P EST MOD 30 MIN: CPT | Mod: PBBFAC,PO | Performed by: INTERNAL MEDICINE

## 2019-11-20 PROCEDURE — 99999 PR PBB SHADOW E&M-EST. PATIENT-LVL IV: CPT | Mod: PBBFAC,,, | Performed by: INTERNAL MEDICINE

## 2019-11-20 PROCEDURE — 99999 PR PBB SHADOW E&M-EST. PATIENT-LVL IV: ICD-10-PCS | Mod: PBBFAC,,, | Performed by: INTERNAL MEDICINE

## 2019-11-20 PROCEDURE — 87591 N.GONORRHOEAE DNA AMP PROB: CPT

## 2019-11-20 RX ORDER — TAZAROTENE 1 MG/G
AEROSOL, FOAM TOPICAL
Refills: 2 | COMMUNITY
Start: 2019-08-20

## 2019-11-20 RX ORDER — DAPSONE 75 MG/G
GEL TOPICAL
Refills: 2 | COMMUNITY
Start: 2019-08-20

## 2019-11-20 NOTE — PATIENT INSTRUCTIONS
Lactose    HPV    Telemedicine Virtual Visits      Preparing for your Virtual Visit  · You must have a mobile phone with iOS or Android operating system. Tablets, laptops, or desktop computers are NOT recommended.   · Your device must have a front-facing camera.  · You must have the Cooking[employer] chaz installed and [facility] selected as your healthcare provider.  · You can find the Stayfilmt chaz in the Chaz Store (iPhone) and Google Play Store (Android).  · Make sure you have the latest version of My Chart by checking for chaz updates on your device   · If you need help before your visit with Stayfilmt, help is available: online at [email or web address] or by phone at 1-104.139.6489  · Have earphones or earbuds available for better audio quality and for added privacy during your visit  · Plan a location for the visit. A quiet and private location is best. Examples include a private office or your car.     E-Pre-Check  · Prior to your virtual visit, you will need to ensure you have the Cook[employer] chaz downloaded. Once the chaz is downloaded you will select your upcoming virtual visit appointment and complete ePre-Check and test your hardware on your tablet/mobile device. Please follow this link below to view instructions on how to complete these steps    [email or web address]    Troubleshooting Your Visit    If you are having trouble with audio make sure to close any other apps on your device that might be using your microphone or audio connection

## 2019-11-20 NOTE — PROGRESS NOTES
"HIV PrEP FOLLOW UP VISIT CONSULT NOTE    SUBJECTIVE:    This is a/an 24 y.o. male here for primary care visit for  Chief Complaint   Patient presents with    Follow-up     results        General Sexual Behavior Evaluation  No flowsheet data found.  If above is blank, patient Clinic Questionnaire results reviewed today and scanned into chart "media" tab for review    States that when he transition to Descovy  within the 1st few days he did have some increased watery bowel movements in the morning 8 hr after his evening dosage of the medication but that went away within about 3 days.  Patient states that he has not had recurrence of the symptoms and feels that this might be a better fit for him instead of Truvada which seemed to cause more gastrointestinal symptoms.  When he has recurrences it tends to be when he has more lactose in the diet and he is trying to restrict lactose.    Patient has had casual sexual partners since the last visit.  States he isn't having any oropharyngeal symptoms but would like to proceed with asymptomatic screening.    STD History  · Detailed HIV/STI risk reduction counseling completed today Deferred      Condom Use  · Detailed counseling relating to condom use completed today Deferred      Substance History  · Detailed counseling relating to substance use to decrease HIV/STI risk completed today Deferred        Medications Reviewed and Updated    Past medical, family, and social histories were reviewed and updated.    Review of Systems negative unless noted otherwise in history of present illness-  ROS    Allergic:    Review of patient's allergies indicates:   Allergen Reactions    Truvada [emtricitabine-tenofovir (tdf)]      diarrhea       OBJECTIVE:  BP: 110/68 Pulse: 98    Wt Readings from Last 3 Encounters:   11/20/19 66.2 kg (145 lb 15.1 oz)   10/16/19 65.9 kg (145 lb 4.5 oz)   08/15/19 68.1 kg (150 lb 2.1 oz)    Body mass index is 20.36 kg/m².  Previous Blood Pressure Readings : "   BP Readings from Last 3 Encounters:   11/20/19 110/68   10/16/19 116/80   08/15/19 110/74       Physical Exam    GEN: No apparent distress  HEENT: oropharynx clear, no cervical lymphadenopathy    CV: no peripheral edema  PULM: breathing non-labored  NEURO: cn ii-xii grossly intact, normal gait   SKIN: no visible rashes on the palms or exposed extremities  : Deferred      Pertinent Labs Reviewed       ASSESSMENT/PLAN:    ASSESSMENT  - HIV PrEP compliance reviewed today and deemed to be Adequate  - reviewed patient home medications for new interactions with Truvada PreP     PLAN  - temporary suspension of HIV PreP indicated  no  - intensive compliance counseling indicated  no  - repeat HIV labs in 3 months will be scheduled today   - need for repeat renal labs (sCR, UA) reviewed today. Labs indicated today no   - repeat HCV Ab testing indicated today yes   - repeat Hep B serology testing indicated no     STI Management Plan  - patient was screened for STI signs and symptoms today  - appropriate action will be taken based on today's findings.   PLAN   - patient counseled on safer sex methods today  - HAV/HBV/HPV vax reviewed for completeness yes    HIV PrEP Patient  -     Hepatitis C antibody; Standing    Lactose intolerance    Exposure to sexually transmitted disease (STD)  -     Misc. C trach/N gonor Amplified RNA Throat  -     Hepatitis C antibody; Standing    Encounter for hepatitis C virus screening test for high risk patient  -     Hepatitis C antibody; Standing        Future Appointments   Date Time Provider Department Center   2/5/2020  8:00 AM SPECIMEN, PANCHO MARTIN SPECLAB [place]   2/5/2020  8:30 AM LAB, [facility] [place]   2/14/2020 12:40 PM Nikhil Rodgers MD [facility]       Nikhil Rodgers  11/24/2019  3:21 PM

## 2019-11-22 LAB
C TRACH RRNA SPEC QL NAA+PROBE: NEGATIVE
C.TRACH RNA SOURCE: ABNORMAL
N.GONORROHEAE, AMP RNA SOURCE: ABNORMAL
N.GONORROHEAE, MISC. AMP RNA: POSITIVE

## 2019-11-23 ENCOUNTER — PATIENT MESSAGE (OUTPATIENT)
Dept: PRIMARY CARE CLINIC | Facility: CLINIC | Age: 24
End: 2019-11-23

## 2019-11-23 ENCOUNTER — PATIENT MESSAGE (OUTPATIENT)
Dept: FAMILY MEDICINE | Facility: CLINIC | Age: 24
End: 2019-11-23

## 2019-11-23 NOTE — PATIENT INSTRUCTIONS
Recently a test of yours came back positive for a sexually transmitted infection (STI). Specifically the infection was gonorrhea . Luckily effective treatment to cure the infection is available.   Because it is a weekend I would recommend going to the nearest [facility] urgent care location.  Let them know that your result has come back positive for gonorrhea and they will provide the appropriate antibiotic treatment.  If this is not possible you can contact my office so we can arrange a treatment visit for Monday.    Your Treatment   Your treatment will involve an injection wit ceftriaxone/rocephin plus a high strength dose of azithomycin/Zithromax. If you have a history of intolerance to any of these antibiotics and you feel that we are not aware of this please let us know immediately. If you are offering an antibiotic tablet and throw up several hours later, please contact us immediately so we can address giving you a different treatment because throwing up a dose of medicine could result in treatment failure.       We recommend that you refrain from sexual activity for at least 7 days after your treatment is completed.For example, if your treatment involves a shot and a single 1 day treatment, cure of the infection is expected 7 days later. If your treatment involves a shot and several days of taking an antibiotic tablet, cure is expected 7 days after you take your last tablet.      Need for Repeat Testing  Based on the above treatment plan you will need to come back 2 weeks after treatment for repeat testing to confirm the treatment was effective because the infection might be resistant to the antibiotics used     Signs You Need Extra Treatment  Many STIs do not cause symptoms. However, if you did have symptoms prior to the diagnosis, treatment failure should be suspected if you do not have complete resolution of symptoms 7 days after treatment is done. If you have lingering symptoms such as those below contact us  immediately to address possible treatment failure.   · Burning with urination  · Abnormal discharge from the penis or the rectum.  · Rectal pain  · Swollen and tender glands of the groin or the throat  · Sore throat    Contacting Sex Partners  We highly recommend that you contact all sex partners for the past 3 months prior to diagnosis. If you feel that this will be difficult and need help to have a partner contacted anonymously about the need to get tested and treated for an STI please let us know. Our staff can discretely and anonymously contact a partner or partners. Just send us the name(s) and a good contact method.

## 2019-11-23 NOTE — TELEPHONE ENCOUNTER
Please contact patient on Monday to confirm that he has gotten treatment.  Assuming he has gotten a treatment visit with antibiotics he needs to come to the clinic to do a throat gonorrhea swab in 2 weeks.  He can do this as an appointment but if no appointment is necessary let us arrange a walk-in visit and I can quickly due a throat swab between patient's.

## 2019-11-25 ENCOUNTER — TELEPHONE (OUTPATIENT)
Dept: INTERNAL MEDICINE | Facility: CLINIC | Age: 24
End: 2019-11-25

## 2019-11-25 ENCOUNTER — PATIENT MESSAGE (OUTPATIENT)
Dept: PRIMARY CARE CLINIC | Facility: CLINIC | Age: 24
End: 2019-11-25

## 2019-11-27 ENCOUNTER — OFFICE VISIT (OUTPATIENT)
Dept: URGENT CARE | Facility: CLINIC | Age: 24
End: 2019-11-27
Payer: MEDICAID

## 2019-11-27 VITALS
HEART RATE: 90 BPM | DIASTOLIC BLOOD PRESSURE: 80 MMHG | HEIGHT: 71 IN | RESPIRATION RATE: 18 BRPM | WEIGHT: 145 LBS | TEMPERATURE: 98 F | BODY MASS INDEX: 20.3 KG/M2 | OXYGEN SATURATION: 98 % | SYSTOLIC BLOOD PRESSURE: 140 MMHG

## 2019-11-27 DIAGNOSIS — A54.9 GONORRHEA IN MALE: Primary | ICD-10-CM

## 2019-11-27 PROCEDURE — 99203 OFFICE O/P NEW LOW 30 MIN: CPT | Mod: S$GLB,,, | Performed by: NURSE PRACTITIONER

## 2019-11-27 PROCEDURE — 99203 PR OFFICE/OUTPT VISIT, NEW, LEVL III, 30-44 MIN: ICD-10-PCS | Mod: S$GLB,,, | Performed by: NURSE PRACTITIONER

## 2019-11-27 RX ORDER — CEFTRIAXONE 250 MG/1
250 INJECTION, POWDER, FOR SOLUTION INTRAMUSCULAR; INTRAVENOUS
Status: COMPLETED | OUTPATIENT
Start: 2019-11-27 | End: 2019-11-27

## 2019-11-27 RX ORDER — AZITHROMYCIN 250 MG/1
TABLET, FILM COATED ORAL
Qty: 4 TABLET | Refills: 0 | Status: SHIPPED | OUTPATIENT
Start: 2019-11-27 | End: 2020-05-21

## 2019-11-27 RX ADMIN — CEFTRIAXONE 250 MG: 250 INJECTION, POWDER, FOR SOLUTION INTRAMUSCULAR; INTRAVENOUS at 01:11

## 2019-11-27 NOTE — PATIENT INSTRUCTIONS
STD:  Take meds as directed  No sex or intercourse for 2 weeks  Follow up with PCP as directed for retesting      Gonorrhea  Gonorrhea is a bacterial infection that is transmitted sexually. Many women and some men who have gonorrhea do not have any signs or symptoms. If not treated, gonorrhea can cause swollen and painful joints and permanent damage to your reproductive organs. It can also make a man or woman unable to have children. If a pregnant woman has gonorrhea, she can infect her baby during childbirth.     Healthcare provider talking to a female patient     Gonorrhea is also called the clap or the drip.   Symptoms  In men:  · Pain or burning when urinating  · Watery, milky, or yellow discharge (drip) from the penis or anus  In women:  · Yellow or white discharge (fluid) from the vagina or anus  · Bleeding between periods  Treatment  Gonorrhea can be cured quickly with antibiotics. If you are being treated, your partner should also be checked by a healthcare provider. Dont have sex while you are being treated.  Prevention  As with all STDs, knowing your partners sexual history is a big step toward preventing gonorrhea. Know the signs and symptoms of the infection. And use latex condoms to reduce your risk.  Resources  American [employer] STD Hotline  871.398.1240  www.ashastd.org  Centers for Disease Control and Prevention  574.945.8159  www.cdc.gov/std   Date Last Reviewed: 12/1/2016 © 2000-2017 [employer]. [address] [place] [address]. All rights reserved. This information is not intended as a substitute for professional medical care. Always follow your healthcare professional's instructions.      Please follow up with your Primary care provider within 2-5 days if your signs and symptoms have not resolved or worsen.     If your condition worsens or fails to improve we recommend that you receive another evaluation at the emergency room immediately or contact  your primary medical clinic to discuss your concerns.   You must understand that you have received an Urgent Care treatment only and that you may be released before all of your medical problems are known or treated. You, the patient, will arrange for follow up care as instructed.     RED FLAGS/WARNING SYMPTOMS DISCUSSED WITH PATIENT THAT WOULD WARRANT EMERGENT MEDICAL ATTENTION. PATIENT VERBALIZED UNDERSTANDING.

## 2019-11-27 NOTE — PROGRESS NOTES
"Subjective:       Patient ID: Alcides Pascal Jr. is a 24 y.o. male.    Vitals:  height is 5' 11" (1.803 m) and weight is 65.8 kg (145 lb). His temperature is 98.1 °F (36.7 °C). His blood pressure is 140/80 (abnormal) and his pulse is 90. His respiration is 18 and oxygen saturation is 98%.     Chief Complaint: Medication Refill    Medication Refill   This is a new problem. The current episode started today. The problem has been unchanged. Pertinent negatives include no arthralgias, chest pain, chills, congestion, coughing, fatigue, fever, headaches, joint swelling, myalgias, nausea, rash, sore throat, vertigo or vomiting. Nothing aggravates the symptoms. He has tried nothing for the symptoms. The treatment provided no relief.       Constitution: Negative for chills, fatigue and fever.   HENT: Negative for congestion and sore throat.    Neck: Negative for painful lymph nodes.   Cardiovascular: Negative for chest pain and leg swelling.   Eyes: Negative for double vision and blurred vision.   Respiratory: Negative for cough and shortness of breath.    Gastrointestinal: Negative for nausea, vomiting and diarrhea.   Genitourinary: Negative for dysuria, frequency and urgency.   Musculoskeletal: Negative for joint pain, joint swelling, muscle cramps and muscle ache.   Skin: Negative for color change, pale and rash.   Allergic/Immunologic: Negative for seasonal allergies.   Neurological: Negative for dizziness, history of vertigo, light-headedness, passing out and headaches.   Hematologic/Lymphatic: Negative for swollen lymph nodes, easy bruising/bleeding and history of blood clots. Does not bruise/bleed easily.   Psychiatric/Behavioral: Negative for nervous/anxious, sleep disturbance and depression. The patient is not nervous/anxious.        Objective:      Physical Exam   Constitutional: He is oriented to person, place, and time. He appears well-developed and well-nourished. He is cooperative.  Non-toxic appearance. He " does not appear ill. No distress.   HENT:   Head: Normocephalic and atraumatic.   Right Ear: Hearing, tympanic membrane, external ear and ear canal normal.   Left Ear: Hearing, tympanic membrane, external ear and ear canal normal.   Nose: Nose normal. No mucosal edema, rhinorrhea or nasal deformity. No epistaxis. Right sinus exhibits no maxillary sinus tenderness and no frontal sinus tenderness. Left sinus exhibits no maxillary sinus tenderness and no frontal sinus tenderness.   Mouth/Throat: Uvula is midline, oropharynx is clear and moist and mucous membranes are normal. No trismus in the jaw. Normal dentition. No uvula swelling. No oropharyngeal exudate, posterior oropharyngeal edema, posterior oropharyngeal erythema or tonsillar abscesses.   Eyes: Pupils are equal, round, and reactive to light. Conjunctivae and lids are normal. Right eye exhibits no discharge. Left eye exhibits no discharge. No scleral icterus.   Neck: Trachea normal, normal range of motion, full passive range of motion without pain and phonation normal. Neck supple.   Cardiovascular: Normal rate, regular rhythm, normal heart sounds, intact distal pulses and normal pulses.   Pulmonary/Chest: Effort normal and breath sounds normal. No respiratory distress.   Abdominal: Soft. Normal appearance and bowel sounds are normal. He exhibits no distension, no pulsatile midline mass and no mass. There is no tenderness.   Musculoskeletal: Normal range of motion. He exhibits no edema or deformity.   Neurological: He is alert and oriented to person, place, and time. He exhibits normal muscle tone. Coordination normal.   Skin: Skin is warm, dry, intact, not diaphoretic and not pale.   Psychiatric: He has a normal mood and affect. His speech is normal and behavior is normal. Judgment and thought content normal. Cognition and memory are normal.   Nursing note and vitals reviewed.        Assessment:       1. Gonorrhea in male        Plan:       Pt seen by PCP and  informed or Gonoccal infection. Informed to come into Urgent Care for treatment.     Gonorrhea in male  -     cefTRIAXone injection 250 mg  -     azithromycin (ZITHROMAX Z-CHAO) 250 MG tablet; Take all four tabs at once  Dispense: 4 tablet; Refill: 0    Pt to follow up with PCP as directed and take meds as directed.     Patient Instructions     STD:  Take meds as directed  No sex or intercourse for 2 weeks  Follow up with PCP as directed for retesting      Gonorrhea  Gonorrhea is a bacterial infection that is transmitted sexually. Many women and some men who have gonorrhea do not have any signs or symptoms. If not treated, gonorrhea can cause swollen and painful joints and permanent damage to your reproductive organs. It can also make a man or woman unable to have children. If a pregnant woman has gonorrhea, she can infect her baby during childbirth.     Healthcare provider talking to a female patient     Gonorrhea is also called the clap or the drip.   Symptoms  In men:  · Pain or burning when urinating  · Watery, milky, or yellow discharge (drip) from the penis or anus  In women:  · Yellow or white discharge (fluid) from the vagina or anus  · Bleeding between periods  Treatment  Gonorrhea can be cured quickly with antibiotics. If you are being treated, your partner should also be checked by a healthcare provider. Dont have sex while you are being treated.  Prevention  As with all STDs, knowing your partners sexual history is a big step toward preventing gonorrhea. Know the signs and symptoms of the infection. And use latex condoms to reduce your risk.  Resources  American Social Health Association STD Hotline  528.467.6143  www.ashastd.org  Centers for Disease Control and Prevention  858.721.6907  www.cdc.gov/std   Date Last Reviewed: 12/1/2016  © 9859-2018 [employer]. [address] [place] [address], [place] [address]. All rights reserved. This information is not intended as a substitute for  professional medical care. Always follow your healthcare professional's instructions.      Please follow up with your Primary care provider within 2-5 days if your signs and symptoms have not resolved or worsen.     If your condition worsens or fails to improve we recommend that you receive another evaluation at the emergency room immediately or contact your primary medical clinic to discuss your concerns.   You must understand that you have received an Urgent Care treatment only and that you may be released before all of your medical problems are known or treated. You, the patient, will arrange for follow up care as instructed.     RED FLAGS/WARNING SYMPTOMS DISCUSSED WITH PATIENT THAT WOULD WARRANT EMERGENT MEDICAL ATTENTION. PATIENT VERBALIZED UNDERSTANDING.

## 2019-11-29 ENCOUNTER — PATIENT MESSAGE (OUTPATIENT)
Dept: PRIMARY CARE CLINIC | Facility: CLINIC | Age: 24
End: 2019-11-29

## 2019-12-16 ENCOUNTER — PATIENT MESSAGE (OUTPATIENT)
Dept: PRIMARY CARE CLINIC | Facility: CLINIC | Age: 24
End: 2019-12-16

## 2019-12-16 ENCOUNTER — TELEPHONE (OUTPATIENT)
Dept: PHARMACY | Facility: CLINIC | Age: 24
End: 2019-12-16

## 2020-01-15 DIAGNOSIS — Z20.6 CONTACT WITH AND (SUSPECTED) EXPOSURE TO HUMAN IMMUNODEFICIENCY VIRUS (HIV): ICD-10-CM

## 2020-01-16 ENCOUNTER — TELEPHONE (OUTPATIENT)
Dept: PHARMACY | Facility: CLINIC | Age: 25
End: 2020-01-16

## 2020-02-05 ENCOUNTER — LAB VISIT (OUTPATIENT)
Dept: LAB | Facility: HOSPITAL | Age: 25
End: 2020-02-05
Attending: INTERNAL MEDICINE
Payer: MEDICAID

## 2020-02-05 DIAGNOSIS — Z11.59 ENCOUNTER FOR HEPATITIS C VIRUS SCREENING TEST FOR HIGH RISK PATIENT: ICD-10-CM

## 2020-02-05 DIAGNOSIS — Z20.6 CONTACT WITH AND (SUSPECTED) EXPOSURE TO HUMAN IMMUNODEFICIENCY VIRUS (HIV): ICD-10-CM

## 2020-02-05 DIAGNOSIS — Z91.89 ENCOUNTER FOR HEPATITIS C VIRUS SCREENING TEST FOR HIGH RISK PATIENT: ICD-10-CM

## 2020-02-05 DIAGNOSIS — Z11.3 SCREENING EXAMINATION FOR STD (SEXUALLY TRANSMITTED DISEASE): ICD-10-CM

## 2020-02-05 DIAGNOSIS — Z20.2 EXPOSURE TO SEXUALLY TRANSMITTED DISEASE (STD): ICD-10-CM

## 2020-02-05 LAB
HCV AB SERPL QL IA: NEGATIVE
HIV 1+2 AB+HIV1 P24 AG SERPL QL IA: NEGATIVE

## 2020-02-05 PROCEDURE — 36415 COLL VENOUS BLD VENIPUNCTURE: CPT | Mod: PO

## 2020-02-05 PROCEDURE — 86803 HEPATITIS C AB TEST: CPT

## 2020-02-05 PROCEDURE — 86592 SYPHILIS TEST NON-TREP QUAL: CPT

## 2020-02-05 PROCEDURE — 86703 HIV-1/HIV-2 1 RESULT ANTBDY: CPT

## 2020-02-06 LAB — RPR SER QL: NORMAL

## 2020-02-12 ENCOUNTER — TELEPHONE (OUTPATIENT)
Dept: PHARMACY | Facility: CLINIC | Age: 25
End: 2020-02-12

## 2020-02-14 ENCOUNTER — PATIENT MESSAGE (OUTPATIENT)
Dept: INTERNAL MEDICINE | Facility: CLINIC | Age: 25
End: 2020-02-14

## 2020-02-14 ENCOUNTER — TELEPHONE (OUTPATIENT)
Dept: PRIMARY CARE CLINIC | Facility: CLINIC | Age: 25
End: 2020-02-14

## 2020-02-14 ENCOUNTER — TELEPHONE (OUTPATIENT)
Dept: INTERNAL MEDICINE | Facility: CLINIC | Age: 25
End: 2020-02-14

## 2020-02-18 ENCOUNTER — TELEPHONE (OUTPATIENT)
Dept: INTERNAL MEDICINE | Facility: CLINIC | Age: 25
End: 2020-02-18

## 2020-03-12 ENCOUNTER — TELEPHONE (OUTPATIENT)
Dept: PHARMACY | Facility: CLINIC | Age: 25
End: 2020-03-12

## 2020-03-20 ENCOUNTER — PATIENT MESSAGE (OUTPATIENT)
Dept: INTERNAL MEDICINE | Facility: CLINIC | Age: 25
End: 2020-03-20

## 2020-03-20 DIAGNOSIS — R30.0 DYSURIA: Primary | ICD-10-CM

## 2020-04-13 ENCOUNTER — PATIENT MESSAGE (OUTPATIENT)
Dept: INTERNAL MEDICINE | Facility: CLINIC | Age: 25
End: 2020-04-13

## 2020-04-14 ENCOUNTER — TELEPHONE (OUTPATIENT)
Dept: PHARMACY | Facility: CLINIC | Age: 25
End: 2020-04-14

## 2020-05-04 ENCOUNTER — PATIENT MESSAGE (OUTPATIENT)
Dept: PHARMACY | Facility: CLINIC | Age: 25
End: 2020-05-04

## 2020-05-06 ENCOUNTER — PATIENT MESSAGE (OUTPATIENT)
Dept: INTERNAL MEDICINE | Facility: CLINIC | Age: 25
End: 2020-05-06

## 2020-05-06 NOTE — TELEPHONE ENCOUNTER
Notify mdo of lack of response from patient.     Mark Neff, R.Ph., AAHIVP  Clinical Pharmacist, HIV/HCV  [facility]  Phone: [phone]

## 2020-05-18 ENCOUNTER — LAB VISIT (OUTPATIENT)
Dept: LAB | Facility: HOSPITAL | Age: 25
End: 2020-05-18
Attending: INTERNAL MEDICINE
Payer: MEDICAID

## 2020-05-18 DIAGNOSIS — K58.0 IRRITABLE BOWEL SYNDROME WITH DIARRHEA: ICD-10-CM

## 2020-05-18 DIAGNOSIS — Z20.6 CONTACT WITH AND (SUSPECTED) EXPOSURE TO HUMAN IMMUNODEFICIENCY VIRUS (HIV): ICD-10-CM

## 2020-05-18 LAB
ALBUMIN SERPL BCP-MCNC: 4.6 G/DL (ref 3.5–5.2)
ALP SERPL-CCNC: 72 U/L (ref 55–135)
ALT SERPL W/O P-5'-P-CCNC: 18 U/L (ref 10–44)
ANION GAP SERPL CALC-SCNC: 10 MMOL/L (ref 8–16)
AST SERPL-CCNC: 22 U/L (ref 10–40)
BILIRUB SERPL-MCNC: 0.7 MG/DL (ref 0.1–1)
BUN SERPL-MCNC: 13 MG/DL (ref 6–20)
CALCIUM SERPL-MCNC: 9.3 MG/DL (ref 8.7–10.5)
CHLORIDE SERPL-SCNC: 104 MMOL/L (ref 95–110)
CO2 SERPL-SCNC: 26 MMOL/L (ref 23–29)
CREAT SERPL-MCNC: 0.9 MG/DL (ref 0.5–1.4)
EST. GFR  (AFRICAN AMERICAN): >60 ML/MIN/1.73 M^2
EST. GFR  (NON AFRICAN AMERICAN): >60 ML/MIN/1.73 M^2
GLUCOSE SERPL-MCNC: 86 MG/DL (ref 70–110)
POTASSIUM SERPL-SCNC: 4.1 MMOL/L (ref 3.5–5.1)
PROT SERPL-MCNC: 7.3 G/DL (ref 6–8.4)
SODIUM SERPL-SCNC: 140 MMOL/L (ref 136–145)

## 2020-05-18 PROCEDURE — 80053 COMPREHEN METABOLIC PANEL: CPT

## 2020-05-18 PROCEDURE — 86703 HIV-1/HIV-2 1 RESULT ANTBDY: CPT

## 2020-05-18 PROCEDURE — 36415 COLL VENOUS BLD VENIPUNCTURE: CPT | Mod: PO

## 2020-05-19 DIAGNOSIS — Z20.6 CONTACT WITH AND (SUSPECTED) EXPOSURE TO HUMAN IMMUNODEFICIENCY VIRUS (HIV): ICD-10-CM

## 2020-05-19 LAB — HIV 1+2 AB+HIV1 P24 AG SERPL QL IA: NEGATIVE

## 2020-05-21 ENCOUNTER — OFFICE VISIT (OUTPATIENT)
Dept: INTERNAL MEDICINE | Facility: CLINIC | Age: 25
End: 2020-05-21
Payer: MEDICAID

## 2020-05-21 DIAGNOSIS — Z20.6 CONTACT WITH AND (SUSPECTED) EXPOSURE TO HUMAN IMMUNODEFICIENCY VIRUS (HIV): Primary | ICD-10-CM

## 2020-05-21 DIAGNOSIS — Z11.3 SCREEN FOR STD (SEXUALLY TRANSMITTED DISEASE): ICD-10-CM

## 2020-05-21 PROCEDURE — 99441 PR PHYSICIAN TELEPHONE EVALUATION 5-10 MIN: ICD-10-PCS | Mod: 95,,, | Performed by: INTERNAL MEDICINE

## 2020-05-21 PROCEDURE — 99441 PR PHYSICIAN TELEPHONE EVALUATION 5-10 MIN: CPT | Mod: 95,,, | Performed by: INTERNAL MEDICINE

## 2020-05-21 NOTE — PROGRESS NOTES
"HIV PrEP FOLLOW UP VISIT CONSULT NOTE    SUBJECTIVE:    This is a/an 24 y.o. male here for primary care visit for  Chief Complaint   Patient presents with    PrEP       General Sexual Behavior Evaluation  HIV PrEP Questionnaire 5/21/2020   Do you anticipate a major change in your income in the next 3 months?  No   Do you anticipate a major change in your medical insurance in the next 3 months? No   Do you anticipate a major change in your place of residence in the next 3 months? No   In the past 3 months, my use of PrEP can be best described as Taking a complete holiday from PrEP (more than a week).   In the past 3 weeks have you had brand new symptoms such as fever, fatigue, body aches, sore throat, headache, or diarrhea? No   Since your last visit, have you engaged in the following behaviors? sex with 0 partners   genital sores / wounds No   unusual sores on tongue, lips, throat No   groin tenderness  No   painful urination No   abnormal genital discharge No   unusual rectal pain or discharge No   rash on palms of the hands / soles feet No   unusual rash elsewhere on the body No   Do  you often (2 or more times/week) take NSAID medicines? No   Some recent data might be hidden     If above is blank, patient Clinic Questionnaire results reviewed today and scanned into chart "media" tab for review    Plans to end PrEP holiday 1st week of Lena.  No sexual activity and all for more than a month.  Patient understands that if he is to resume prep medication that he should have HIV test within 7 days of resuming prep medication in the prep medication should be started at least 5 days prior to the onset of sexual activity.    Therapist appt    STD History  · Detailed HIV/STI risk reduction counseling completed today Deferred      Condom Use  · Detailed counseling relating to condom use completed today Deferred      Substance History  · Detailed counseling relating to substance use to decrease HIV/STI risk completed today " Deferred        Medications Reviewed and Updated    Past medical, family, and social histories were reviewed and updated.    Review of Systems negative unless noted otherwise in history of present illness-  ROS    Allergic:  Review of patient's allergies indicates:  No Known Allergies    OBJECTIVE:         Wt Readings from Last 3 Encounters:   11/27/19 65.8 kg (145 lb)   11/20/19 66.2 kg (145 lb 15.1 oz)   10/16/19 65.9 kg (145 lb 4.5 oz)    There is no height or weight on file to calculate BMI.  Previous Blood Pressure Readings :   BP Readings from Last 3 Encounters:   11/27/19 (!) 140/80   11/20/19 110/68   10/16/19 116/80       Physical Exam    GEN: No apparent distress    Pertinent Labs Reviewed       ASSESSMENT/PLAN:    ASSESSMENT  - HIV PrEP compliance reviewed today and deemed to be Inadequate  - reviewed patient home medications for new interactions with Truvada PreP     PLAN  - temporary suspension of HIV PreP indicated  yes  - intensive compliance counseling indicated  no  - repeat HIV labs in 3 months will be scheduled today   - need for repeat renal labs (sCR, UA) reviewed today. Labs indicated today no   - repeat HCV Ab testing indicated today no   - repeat Hep B serology testing indicated no     STI Management Plan  - patient was screened for STI signs and symptoms today  - appropriate action will be taken based on today's findings.   PLAN   - patient counseled on safer sex methods today  - HAV/HBV/HPV vax reviewed for completeness yes    There are no diagnoses linked to this encounter.    No future appointments.    Nikhil Rodgers  5/21/2020  10:17 AM

## 2020-05-26 ENCOUNTER — TELEPHONE (OUTPATIENT)
Dept: PHARMACY | Facility: CLINIC | Age: 25
End: 2020-05-26

## 2020-05-26 NOTE — TELEPHONE ENCOUNTER
Refill readiness for Descovy confirmed with patient; name/ confirmed; no missed doses; no new medications; no side effects noted; patient to  on .   Patient states they have 0 doses remaining.     Patient is re-starting after a gap in PrEP.  Today doty day 7 since his labs and is still within guidelines.  Reviewed basic PrEP education with patient.  He is aware that PrEP is not instantly effective.     Mark Neff, R.Ph., AAHIVP  Clinical Pharmacist, HIV/HCV  [facility]  Phone: [phone]

## 2020-05-27 ENCOUNTER — TELEPHONE (OUTPATIENT)
Dept: INTERNAL MEDICINE | Facility: CLINIC | Age: 25
End: 2020-05-27

## 2020-05-27 ENCOUNTER — PATIENT MESSAGE (OUTPATIENT)
Dept: INTERNAL MEDICINE | Facility: CLINIC | Age: 25
End: 2020-05-27

## 2020-05-27 ENCOUNTER — TELEPHONE (OUTPATIENT)
Dept: PHARMACY | Facility: CLINIC | Age: 25
End: 2020-05-27

## 2020-05-27 ENCOUNTER — OFFICE VISIT (OUTPATIENT)
Dept: INTERNAL MEDICINE | Facility: CLINIC | Age: 25
End: 2020-05-27
Payer: MEDICAID

## 2020-05-27 DIAGNOSIS — Z20.6 CONTACT WITH AND (SUSPECTED) EXPOSURE TO HUMAN IMMUNODEFICIENCY VIRUS (HIV): Primary | ICD-10-CM

## 2020-05-27 PROCEDURE — 99212 OFFICE O/P EST SF 10 MIN: CPT | Mod: 95,,, | Performed by: INTERNAL MEDICINE

## 2020-05-27 PROCEDURE — 99212 PR OFFICE/OUTPT VISIT, EST, LEVL II, 10-19 MIN: ICD-10-PCS | Mod: 95,,, | Performed by: INTERNAL MEDICINE

## 2020-05-27 RX ORDER — EMTRICITABINE AND TENOFOVIR DISOPROXIL FUMARATE 200; 300 MG/1; MG/1
1 TABLET, FILM COATED ORAL DAILY
Qty: 30 TABLET | Refills: 0 | Status: SHIPPED | OUTPATIENT
Start: 2020-05-27 | End: 2020-06-22 | Stop reason: SDUPTHER

## 2020-05-27 RX ORDER — EMTRICITABINE AND TENOFOVIR DISOPROXIL FUMARATE 200; 300 MG/1; MG/1
1 TABLET, FILM COATED ORAL DAILY
Qty: 30 TABLET | Refills: 0 | Status: CANCELLED | OUTPATIENT
Start: 2020-05-27

## 2020-05-27 NOTE — TELEPHONE ENCOUNTER
Tivicay/Truvada initial consult completed on 5/27/20. Patient will pickup Tivicay/Truvada from [facility] on 5/27/20.  This consult follow's up initial education provided by MD following possible or suspected exposure to HIV. Therapy Appropriate.  Patient will start nPEP on 5/27/20.    Tivicay 50mg - Take one tablet by mouth once daily.  Truvada 200/300mg - Take one tablet by mouth once daily.   Counseling was reviewed:   1. Patient MUST take nPEP at the SAME time every day with or without food. Take with food if stomach irritation occurs.   2. Side effects include, but not limited to: headaches, fatigue, insomnia   Contact MD if any signs of allergic reaction   4. Medication list reviewed. No DDIs or allergies noted. Patient MUST contact myself or provider prior to starting any new OTC, herbal, or prescription drugs to avoid potential DDIs.    -Avoid alcohol   -Patient will HOLD Descovy during nPEP      Discussed the importance of staying well hydrated while on therapy. Compliance stressed - patient to take missed doses as soon as remembered, but NOT to take 2 doses in one day. Patient will report questions or concerns to myself or practitioner. Patient verbalizes understanding. Patient plans to start nPEP on 5/27/20. Consultation included: indication; goals of treatment; administration; storage and handling; side effects; how to handle side effects; the importance of compliance; how to handle missed doses; the importance of laboratory monitoring; the importance of keeping all follow up appointments.  Patient understands to report any medication changes to [facility] and provider. All questions answered and addressed to patients satisfaction. Patient to follow up with healthcare provider as instructed.  Patient advised that these f/u appointments and follow up labs will be necessary for ensuring and documenting outcome of nPEP.

## 2020-05-27 NOTE — TELEPHONE ENCOUNTER
PLAN  - Extra STI testing   - Dolutegravir + Descovy, 30 days as PEP  - HIV test in 4 weeks. If negative transition back to PrEP Descovy.   - Review viral symptoms

## 2020-05-27 NOTE — PROGRESS NOTES
The patient location is: home  Visit type: Virtual visit with synchronous audio and video  Total time spent with patient: 20 min    Each patient to whom he or she provides medical services by telemedicine is:  (1) informed of the relationship between the physician and patient and the respective role of any other health care provider with respect to management of the patient; and (2) notified that he or she may decline to receive medical services by telemedicine and may withdraw from such care at any time.     Portions of this note are generated with voice recognition software. Typographical errors may exist.     SUBJECTIVE:    This is a/an 24 y.o. male here for primary care visit for  Chief Complaint   Patient presents with    HIV Exposure Concern     Exposure   2 AM 5/27 patient was the  insertive partner, condom broke, doesn't know HIV status of the partner. Casual sexual partner   Patient had been on a PrEP holiday but resumed PrEP 5/26 with only 1 tablet at 7 AM    Has never taken PEP before.     Medications Reviewed and Updated    Past medical, family, and social histories were reviewed and updated.    Review of Systems negative unless otherwise noted in history of present illness-  ROS    General ROS: negative  Psychological ROS: negative  ENT ROS: negative  Endocrine ROS: Negative  Allergy and Immunology ROS: negative  Cardiovascular ROS: negative  Pulmonary ROS: Negative  Gastrointestinal ROS: negative  Genito-Urinary ROS: negative  Musculoskeletal ROS: negative      Allergic:  Review of patient's allergies indicates:  No Known Allergies    OBJECTIVE:         Wt Readings from Last 3 Encounters:   11/27/19 65.8 kg (145 lb)   11/20/19 66.2 kg (145 lb 15.1 oz)   10/16/19 65.9 kg (145 lb 4.5 oz)    There is no height or weight on file to calculate BMI.  Previous Blood Pressure Readings :   BP Readings from Last 3 Encounters:   11/27/19 (!) 140/80   11/20/19 110/68   10/16/19 116/80       Physical  Exam      Pertinent Labs Reviewed       ASSESSMENT/PLAN:    HIV PrEP Patient.Condition not optimally controlled. Detailed counseling on self care measures. Plan to monitor clinically in addition to plan below or as listed on After Visit Summary.   -     dolutegravir (TIVICAY) 50 mg Tab; Take 1 tablet (50 mg total) by mouth once daily.  Dispense: 30 tablet; Refill: 0  -     emtricitabine-tenofovir 200-300 mg (TRUVADA) 200-300 mg Tab; Take 1 tablet by mouth once daily. For HIV PrEP. Hold Rx for negative HIV test. Refill requires repeat HIV test in 3 months  Dispense: 30 tablet; Refill: 0    PLAN  - Dolutegravir + Descovy, 30 days as PEP  - HIV test in 4 weeks. If negative transition back to PrEP Descovy.   - Review viral symptoms       No future appointments.    Nikhil Rodgers  5/27/2020  10:45 AM

## 2020-05-27 NOTE — TELEPHONE ENCOUNTER
----- Message from Nikhil Rodgers MD sent at 5/27/2020  1:36 PM CDT -----  Contact patient to schedule extra tests in 4 weeks and virtual visit to review results    HIV  [facility]

## 2020-05-27 NOTE — TELEPHONE ENCOUNTER
Informed Patient  that [facility] received prescription for Tivicay & Truvada and benefits investigation is required.  [facility] will be back in touch once insurance determination is received.

## 2020-06-19 ENCOUNTER — LAB VISIT (OUTPATIENT)
Dept: LAB | Facility: HOSPITAL | Age: 25
End: 2020-06-19
Attending: INTERNAL MEDICINE
Payer: MEDICAID

## 2020-06-19 DIAGNOSIS — Z11.3 SCREENING EXAMINATION FOR STD (SEXUALLY TRANSMITTED DISEASE): ICD-10-CM

## 2020-06-19 DIAGNOSIS — Z20.6 CONTACT WITH AND (SUSPECTED) EXPOSURE TO HUMAN IMMUNODEFICIENCY VIRUS (HIV): ICD-10-CM

## 2020-06-19 PROCEDURE — 86592 SYPHILIS TEST NON-TREP QUAL: CPT

## 2020-06-19 PROCEDURE — 86703 HIV-1/HIV-2 1 RESULT ANTBDY: CPT

## 2020-06-19 PROCEDURE — 36415 COLL VENOUS BLD VENIPUNCTURE: CPT | Mod: PO

## 2020-06-20 LAB — RPR SER QL: NORMAL

## 2020-06-22 DIAGNOSIS — Z20.6 CONTACT WITH AND (SUSPECTED) EXPOSURE TO HUMAN IMMUNODEFICIENCY VIRUS (HIV): ICD-10-CM

## 2020-06-22 LAB — HIV 1+2 AB+HIV1 P24 AG SERPL QL IA: NEGATIVE

## 2020-06-22 RX ORDER — EMTRICITABINE AND TENOFOVIR DISOPROXIL FUMARATE 200; 300 MG/1; MG/1
1 TABLET, FILM COATED ORAL DAILY
Qty: 90 TABLET | Refills: 0 | Status: SHIPPED | OUTPATIENT
Start: 2020-06-22 | End: 2020-06-23 | Stop reason: ALTCHOICE

## 2020-06-23 ENCOUNTER — OFFICE VISIT (OUTPATIENT)
Dept: INTERNAL MEDICINE | Facility: CLINIC | Age: 25
End: 2020-06-23
Payer: MEDICAID

## 2020-06-23 VITALS
DIASTOLIC BLOOD PRESSURE: 84 MMHG | SYSTOLIC BLOOD PRESSURE: 116 MMHG | TEMPERATURE: 97 F | OXYGEN SATURATION: 99 % | HEIGHT: 71 IN | HEART RATE: 83 BPM | BODY MASS INDEX: 19.94 KG/M2 | WEIGHT: 142.44 LBS

## 2020-06-23 DIAGNOSIS — Z20.822 SUSPECTED COVID-19 VIRUS INFECTION: ICD-10-CM

## 2020-06-23 DIAGNOSIS — Z20.6 CONTACT WITH AND (SUSPECTED) EXPOSURE TO HUMAN IMMUNODEFICIENCY VIRUS (HIV): Primary | ICD-10-CM

## 2020-06-23 PROCEDURE — 99213 PR OFFICE/OUTPT VISIT, EST, LEVL III, 20-29 MIN: ICD-10-PCS | Mod: S$PBB,,, | Performed by: INTERNAL MEDICINE

## 2020-06-23 PROCEDURE — U0003 INFECTIOUS AGENT DETECTION BY NUCLEIC ACID (DNA OR RNA); SEVERE ACUTE RESPIRATORY SYNDROME CORONAVIRUS 2 (SARS-COV-2) (CORONAVIRUS DISEASE [COVID-19]), AMPLIFIED PROBE TECHNIQUE, MAKING USE OF HIGH THROUGHPUT TECHNOLOGIES AS DESCRIBED BY CMS-2020-01-R: HCPCS

## 2020-06-23 PROCEDURE — 99213 OFFICE O/P EST LOW 20 MIN: CPT | Mod: S$PBB,,, | Performed by: INTERNAL MEDICINE

## 2020-06-23 PROCEDURE — 99214 OFFICE O/P EST MOD 30 MIN: CPT | Mod: PBBFAC,PO | Performed by: INTERNAL MEDICINE

## 2020-06-23 PROCEDURE — 99999 PR PBB SHADOW E&M-EST. PATIENT-LVL IV: ICD-10-PCS | Mod: PBBFAC,,, | Performed by: INTERNAL MEDICINE

## 2020-06-23 PROCEDURE — 99999 PR PBB SHADOW E&M-EST. PATIENT-LVL IV: CPT | Mod: PBBFAC,,, | Performed by: INTERNAL MEDICINE

## 2020-06-23 RX ORDER — EMTRICITABINE AND TENOFOVIR ALAFENAMIDE 200; 25 MG/1; MG/1
1 TABLET ORAL DAILY
Qty: 90 TABLET | Refills: 0 | Status: SHIPPED | OUTPATIENT
Start: 2020-06-23 | End: 2020-12-22 | Stop reason: SDUPTHER

## 2020-06-23 NOTE — PROGRESS NOTES
Portions of this note are generated with voice recognition software. Typographical errors may exist.     SUBJECTIVE:    This is a/an 24 y.o. male here for primary care visit for  Chief Complaint   Patient presents with    Follow-up       No intimate partners past 4 weeks, since the episode that prompted PEP use. Tolerating PEP. Has < 1 week left of PEP medicines.       Patient has attended a social justice demonstration 3 weeks ago, greatly expanding his contact with the public. He wore a mask and people in his immediate vicinity also with masks. He was at the social gathering for 1 hr. At times difficult to keep social distancing. No COVID symptoms.       Answers for HPI/ROS submitted by the patient on 6/22/2020   activity change: No  unexpected weight change: No  neck pain: No  hearing loss: No  rhinorrhea: No  trouble swallowing: No  eye discharge: No  visual disturbance: No  chest tightness: No  wheezing: No  chest pain: No  palpitations: No  blood in stool: No  constipation: No  vomiting: No  diarrhea: No  polydipsia: No  polyuria: No  difficulty urinating: No  urgency: No  hematuria: No  joint swelling: No  arthralgias: No  headaches: No  weakness: No  confusion: No  dysphoric mood: No        Medications Reviewed and Updated    Past medical, family, and social histories were reviewed and updated.    Review of Systems negative unless otherwise noted in history of present illness-  ROS    General ROS: negative  Psychological ROS: negative  ENT ROS: negative  Endocrine ROS: Negative  Allergy and Immunology ROS: negative  Cardiovascular ROS: negative  Pulmonary ROS: Negative  Gastrointestinal ROS: negative  Genito-Urinary ROS: negative  Musculoskeletal ROS: negative        Allergic:  Review of patient's allergies indicates:  No Known Allergies    OBJECTIVE:  BP: 116/84 Pulse: 83 Temp: 97.3 °F (36.3 °C)  Wt Readings from Last 3 Encounters:   06/23/20 64.6 kg (142 lb 6.7 oz)   11/27/19 65.8 kg (145 lb)   11/20/19  66.2 kg (145 lb 15.1 oz)    Body mass index is 19.86 kg/m².  Previous Blood Pressure Readings :   BP Readings from Last 3 Encounters:   06/23/20 116/84   11/27/19 (!) 140/80   11/20/19 110/68       Physical Exam    GEN: No apparent distress  HEENT: sclera non-icteric, conjunctiva clear  CV: no peripheral edema  PULM: breathing non-labored  ABD: non protuberant abdomen.  PSYCH: appropriate affect  MSK: able to rise from chair without assistance  SKIN: normal skin turgor    Pertinent Labs Reviewed       ASSESSMENT/PLAN:    HIV PrEP Patient.Condition stable.  Counseling given today on self-care measures. Plan to monitor clinically. Continue current medical plan.   -     emtricitabine-tenofovir alafen (DESCOVY) 200-25 mg Tab; Take 1 tablet by mouth once daily.  Dispense: 90 tablet; Refill: 0    Suspected Covid-19 Virus Infection  -     COVID-19 Routine Screening        No future appointments.    Nikhil Rodgers  6/23/2020  10:15 AM

## 2020-06-24 ENCOUNTER — TELEPHONE (OUTPATIENT)
Dept: PHARMACY | Facility: CLINIC | Age: 25
End: 2020-06-24

## 2020-06-24 NOTE — TELEPHONE ENCOUNTER
Patient called for f/u at end of nPEP and to determine medication status relative to Descovy for PrEP which he will be starting.  NA LVM.    Mark Neff R.Ph., AAHIVP  Clinical Pharmacist, HIV/HCV  [facility]  Phone: [phone]

## 2020-06-26 LAB — SARS-COV-2 RNA RESP QL NAA+PROBE: NOT DETECTED

## 2020-08-04 ENCOUNTER — OFFICE VISIT (OUTPATIENT)
Dept: URGENT CARE | Facility: CLINIC | Age: 25
End: 2020-08-04
Payer: MEDICAID

## 2020-08-04 VITALS
DIASTOLIC BLOOD PRESSURE: 72 MMHG | SYSTOLIC BLOOD PRESSURE: 133 MMHG | TEMPERATURE: 99 F | BODY MASS INDEX: 19.88 KG/M2 | OXYGEN SATURATION: 98 % | HEIGHT: 71 IN | WEIGHT: 142 LBS | HEART RATE: 98 BPM

## 2020-08-04 DIAGNOSIS — R30.0 DYSURIA: ICD-10-CM

## 2020-08-04 DIAGNOSIS — N30.00 ACUTE CYSTITIS WITHOUT HEMATURIA: Primary | ICD-10-CM

## 2020-08-04 DIAGNOSIS — R36.9 PENILE DISCHARGE: ICD-10-CM

## 2020-08-04 LAB
BILIRUB UR QL STRIP: NEGATIVE
GLUCOSE UR QL STRIP: NEGATIVE
KETONES UR QL STRIP: NEGATIVE
LEUKOCYTE ESTERASE UR QL STRIP: POSITIVE
PH, POC UA: 6
POC BLOOD, URINE: POSITIVE
POC NITRATES, URINE: NEGATIVE
PROT UR QL STRIP: NEGATIVE
SP GR UR STRIP: 1.01 (ref 1–1.03)
UROBILINOGEN UR STRIP-ACNC: NORMAL (ref 0.3–2.2)

## 2020-08-04 PROCEDURE — 99214 PR OFFICE/OUTPT VISIT, EST, LEVL IV, 30-39 MIN: ICD-10-PCS | Mod: 25,S$GLB,, | Performed by: PHYSICIAN ASSISTANT

## 2020-08-04 PROCEDURE — 87491 CHLMYD TRACH DNA AMP PROBE: CPT

## 2020-08-04 PROCEDURE — 81003 POCT URINALYSIS, DIPSTICK, AUTOMATED, W/O SCOPE: ICD-10-PCS | Mod: QW,S$GLB,, | Performed by: PHYSICIAN ASSISTANT

## 2020-08-04 PROCEDURE — 99214 OFFICE O/P EST MOD 30 MIN: CPT | Mod: 25,S$GLB,, | Performed by: PHYSICIAN ASSISTANT

## 2020-08-04 PROCEDURE — 87661 TRICHOMONAS VAGINALIS AMPLIF: CPT

## 2020-08-04 PROCEDURE — 81003 URINALYSIS AUTO W/O SCOPE: CPT | Mod: QW,S$GLB,, | Performed by: PHYSICIAN ASSISTANT

## 2020-08-04 PROCEDURE — 87086 URINE CULTURE/COLONY COUNT: CPT

## 2020-08-04 RX ORDER — CEFTRIAXONE 1 G/1
250 INJECTION, POWDER, FOR SOLUTION INTRAMUSCULAR; INTRAVENOUS
Status: COMPLETED | OUTPATIENT
Start: 2020-08-04 | End: 2020-08-04

## 2020-08-04 RX ORDER — AZITHROMYCIN 500 MG/1
1000 TABLET, FILM COATED ORAL ONCE
Qty: 2 TABLET | Refills: 0 | Status: SHIPPED | OUTPATIENT
Start: 2020-08-04 | End: 2020-08-04

## 2020-08-04 RX ORDER — SULFAMETHOXAZOLE AND TRIMETHOPRIM 800; 160 MG/1; MG/1
1 TABLET ORAL 2 TIMES DAILY
Qty: 14 TABLET | Refills: 0 | Status: SHIPPED | OUTPATIENT
Start: 2020-08-04 | End: 2020-08-11

## 2020-08-04 RX ORDER — LIDOCAINE HYDROCHLORIDE 10 MG/ML
1 INJECTION INFILTRATION; PERINEURAL
Status: COMPLETED | OUTPATIENT
Start: 2020-08-04 | End: 2020-08-04

## 2020-08-04 RX ADMIN — CEFTRIAXONE 250 MG: 1 INJECTION, POWDER, FOR SOLUTION INTRAMUSCULAR; INTRAVENOUS at 08:08

## 2020-08-04 RX ADMIN — LIDOCAINE HYDROCHLORIDE 1 ML: 10 INJECTION INFILTRATION; PERINEURAL at 08:08

## 2020-08-05 NOTE — PATIENT INSTRUCTIONS
PLEASE READ YOUR DISCHARGE INSTRUCTIONS ENTIRELY AS IT CONTAINS IMPORTANT INFORMATION.  Testing was sent to the lab today.  We will call to discuss your results in 3-5 days.  Please do not start the Bactrim until tomorrow.  Take the azithromycin tonight.  - Rest.    - Drink plenty of fluids.    - Tylenol or Ibuprofen as directed as needed for fever/pain.    - If you were prescribed antibiotics, please take them to completion.  - If you are female and on birth control pills - please use additional methods of contraception to prevent pregnancy while on antibiotics and for one cycle after.   - If you were prescribed a narcotic medication or muscle relaxer, do not drive or operate heavy equipment or machinery while taking these medications, as they can cause drowsiness.   - If you smoke, please stop smoking.  -You must understand that you've received an Urgent Care treatment only and that you may be released before all your medical problems are known or treated. You, the patient, will    arrange for follow up care as instructed. Please arrange follow up with your primary medical clinic as soon as possible.   - Follow up with your PCP or specialty clinic as directed in the next 1-2 weeks if not improved or as needed.  You can call [phone] to schedule an appointment with the appropriate provider.    - Please return to Urgent Care or to the Emergency Department if your symptoms worsen.    Patient aware and verbalized understanding.    Bladder Infection, Male (Adult)    You have a bladder infection.  Urine is normally free of bacteria. But bacteria can get into the urinary tract from the skin around the rectum or it may travel in the blood from elsewhere in the body.  This is called a urinary tract infection (UTI). An infection can occur anywhere in the urinary tract. It could be in a kidney (pyelonephritis)or in the bladder (cystitis) and urethra (urethritis). The urethra is the tube that drains the urine from the  bladder through the tip of the penis.  The most common place for a UTI is in the bladder. This is called a bladder infection. Most bladder infections are easily treated. They are not serious unless the infection spreads up to the kidney.  The terms bladder infection, UTI, and cystitis are often used to describe the same thing, but they arent always the same. Cystitis is an inflammation of the bladder. The most common cause of cystitis is an infection.   Keep in mind:  · Infections in the urine are called UTIs.  · Cystitis is usually caused by a UTI.  · Not all UTIs and cases of cystitis are bladder infections.  · Bladder infections are the most common type of cystitis.  Symptoms of a bladder infection  The infection causes inflammation in the urethra and bladder. This inflammation causes many of the symptoms. The most common symptoms of a bladder infection are:  · Pain or burning when urinating  · Having to go more often than usual  · Feeling like you need to go right away  · Only a small amount comes out  · Blood in urine  · Discomfort in your belly (abdomen), usually in the lower abdomen, above the pubic bone  · Cloudy, strong, or bad smelling urine  · Unable to urinate (retention)  · Urinary incontinence  · Fever  · Loss of appetite  Older adults may also feel confused.  Causes of a bladder infection  Bladder infections are not contagious. You can't get one from someone else, from a toilet seat, or from sharing a bath.  The most common cause of bladder infections is bacteria from the bowels. The bacteria get onto the skin around the opening of the urethra. From there they can get into the urine and travel up to the bladder. This causes inflammation and an infection. This usually happens because of:  · An enlarged prostate  · Poor cleaning of the genitals  · Procedures that put a tube in your bladder, like a Hood catheter  · Bowel incontinence  · Older age  · Not emptying your bladder (The urine stays there,  giving the bacteria a chance to grow.)  · Dehydration (This allows urine to stay in the bladder longer.)  · Constipation (This can cause the bowels to push on the bladder or urethra and keep the bladder from emptying.)  Treatment  Bladder infections are treated with antibiotics. They usually clear up quickly without complications. Treatment helps prevent a more serious kidney infection.  Medicines  Medicines can help in the treatment of a bladder infection:  · You may have been given phenazopyridine to ease burning when you urinate. It will cause your urine to be bright orange. It can stain clothing.  · You may have been prescribed antibiotics. Take this medicine until you have finished it, even if you feel better. Taking all of the medicine will make sure the infection has cleared.  You can use acetaminophen or ibuprofen for pain, fever, or discomfort, unless another medicine was prescribed. You can also alternate them, or use both together. They work differently and are a different class of medicines, so taking them together is not an overdose. If you have chronic liver or kidney disease, talk with your healthcare provider before using these medicines. Also talk with your provider if youve had a stomach ulcer or GI bleeding or are taking blood thinner medicines.  Home care  Here are some guidelines to help you care for yourself at home:  · Drink plenty of fluids, unless your healthcare provider told you not to. Fluids will prevent dehydration and flush out your bladder.  · Use good personal hygiene. Wipe from front to back after using the toilet, and clean your penis regularly. If you arent circumcised, retract the foreskin when cleaning.  · Urinate more frequently, and dont try to hold it in for long periods of time, if possible.  · Wear loose-fitting clothes and cotton underwear. Avoid tight-fitting pants. This helps keep you clean and dry.  · Change your diet to prevent constipation. This means eating more  fresh foods and more fiber, and less junk and fatty foods.  · Avoid sex until your symptoms are gone.  · Avoid caffeine, alcohol, and spicy foods. These can irritate the bladder.  Follow-up care  Follow up with your healthcare provider, or as advised if all symptoms have not cleared up within 5 days. It is important to keep your follow-up appointment. You can talk with your provider to see if you need more tests of the urinary tract. This is especially important if you have infections that keep coming back.  If a culture was done, you will be told if your treatment needs to be changed. If directed, you can call to find out the results.  If X-rays were taken, you will be told of any findings that may affect your care.  Call 911  Call 911 if any of these occur:  · Trouble breathing  · Difficulty waking up  · Feeling confused  · Fainting or loss of consciousness  · Rapid heart rate  When to seek medical advice  Call your healthcare provider right away if any of these occur:  · Fever of 100.4ºF (38ºC) or higher, or as directed by your healthcare provider  · Your symptoms dont improve after 2 days of treatment  · Back or abdominal pain that gets worse  · Repeated vomiting, or you arent able to keep medicine down  · Weakness or dizziness  Date Last Reviewed: 10/1/2016  © 0151-8228 [employer]. [address] [place] [address], [place] [address]. All rights reserved. This information is not intended as a substitute for professional medical care. Always follow your healthcare professional's instructions.        Urethritis Due to Gonorrhea or Chlamydia (Adult male)    You have urethritis. This is an inflammation in the urethra. The urethra is the tube between the bladder and the tip of the penis. Urine drains out of the body through the urethra. There are 2 main types of this condition:  · Gonococcal urethritis () is an infection caused by gonorrhea.  · Non-gonococcal urethritis (TEOFILO) is an infection that is usually  caused by chlamydia. Other infections can also be the cause.  Women often have no symptoms. Men are more likely to have symptoms, but may not. Symptoms can start within 1 week after infection, but can take a month or more, if they even occur. Some symptoms are:  · Burning or pain when urinating  · Irritation in the penis  · Pus discharge from the penis  · Pain and possible swelling in one or both testicles  Infections in the urethra are usually caused by sexually transmitted diseases, or STDs. The most common infections are gonorrhea, chlamydia, or both.  Gonorrhea infections  Gonococcal urethritis () is an infection of the urethra. It is caused by gonorrhea. Gonorrhea is a sexually transmitted infection (STI). Gonorrhea can also be in other areas of the body. This can cause:  · Rectal pain and discharge  · Throat infection  · Eye infections (conjunctivitis)  Without treatment, the infection can get worse and spread to other parts of your body. The infection can cause rashes, arthritis, and infections in your joints, heart, and brain.  Non-gonococcal infections, or TEOFILO infections  Non-gonococcal urethritis (TEOFILO) is an infection of the urethra. It is usually caused by chlamydia. Symptoms may clear up in a few weeks or months, even without treatment. However, without treatment, the bacteria that cause TEOFILO can stay in the urethra. This means that even if symptoms clear, you can still have an infection. You can spread it to others if you are not treated.  Urethritis caused by an infection can be cured, but it needs to be treated with antibiotics. If you don't get treated, you can give it to someone else. If you give it to a woman, it can cause a serious pelvic infection and infertility.  It is important to remember that you can have an infection without symptoms. For this reason, your sexual partner(s) needs to be treated, even if he or she has no symptoms. If he or she is not treated, and you continue to have sex,  you will be infected again. Your partner(s) should contact his or her own healthcare provider to be examined and treated. An urgent care clinic or the Public Health Department can also do this.  Home care  The following guidelines will help you care for yourself at home:  · Take all the antibiotics you were given until they are used up. It is important to finish them, even if you are feeling better. This ensures the infection is completely cleared up.  · No sex until both you and your partner(s) have finished all the antibiotics, and your healthcare provider says you are no longer contagious.  · You can take acetaminophen or ibuprofen for pain, unless you were given a different pain medicine. If you have chronic liver or kidney disease or have ever had a stomach ulcer or GI bleeding, or are taking blood thinners, talk with your healthcare provider before using these medicines.  · Aspirin should never be used in anyone under 18 years of age who has a fever.  · Learn about and use safe sex practices. The safest sex is with a partner who has tested negative and only has sex with you. Condoms can keep some STDs from spreading, including gonorrhea, chlamydia and HIV, but are not a guarantee.  Follow-up care  Follow up with your healthcare provider, or as advised. If a culture test was taken, you may call for the results as directed. Another culture test should be done 4 to 6 weeks after treatment to be sure the infection is gone. Follow up with your healthcare provider or the Public Health Department for a complete STD screening, including HIV testing. For more information about STDs, contact CDC-INFO at 415-712-4418.  When to seek medical advice  Call your healthcare provider right away if any of these occur:  · No improvement after 3 days of treatment, although you can have some symptoms longer  · Unable to urinate  · Rash or joint pain  · Painful sores on the penis  · Enlarged painful lymph nodes (lumps) in the  groin  · Testicle pain or swelling of the scrotum  Date Last Reviewed: 10/1/2016  © 8852-7122 [employer]. [address] [place] [address], [place] [address]. All rights reserved. This information is not intended as a substitute for professional medical care. Always follow your healthcare professional's instructions.

## 2020-08-05 NOTE — PROGRESS NOTES
"Subjective:       Patient ID: Alcides Pascal Jr. is a 25 y.o. male.    Vitals:  height is 5' 11" (1.803 m) and weight is 64.4 kg (142 lb). His oral temperature is 98.9 °F (37.2 °C). His blood pressure is 133/72 and his pulse is 98. His oxygen saturation is 98%.     Chief Complaint: Dysuria    Mr. Pascal presents for evaluation of dysuria, urinary frequency and urgency that started 2 days ago.  He also believes he saw penile discharge.  He is sexually active with multiple male partners, but reports he always uses condoms.  He denies any fevers, chills, flank pain, back pain, scrotal edema or pain, new penile lesions or sores.  He has not taken anything for his symptoms.    Dysuria   This is a new problem. Episode onset: 2 DAYS AGO. The problem occurs every urination. The problem has been gradually worsening. The pain is at a severity of 5/10. The pain is moderate. There has been no fever. He is sexually active. There is no history of pyelonephritis. Associated symptoms include frequency and urgency. Pertinent negatives include no behavior changes, chills, discharge, flank pain, hematuria, hesitancy, nausea, possible pregnancy, sweats, vomiting, weight loss, bubble bath use, constipation, rash or withholding. He has tried nothing for the symptoms. The treatment provided no relief. There is no history of catheterization, diabetes insipidus, diabetes mellitus, genitourinary reflux, hypertension, kidney stones, recurrent UTIs, a single kidney, STD, urinary stasis or a urological procedure.       Constitution: Negative for chills, fatigue and fever.   HENT: Negative for ear pain, ear discharge, congestion and sore throat.    Neck: Negative for painful lymph nodes.   Cardiovascular: Negative for chest trauma, chest pain, leg swelling, palpitations, sob on exertion and passing out.   Eyes: Negative for double vision and blurred vision.   Respiratory: Negative for cough and shortness of breath.    Gastrointestinal: " Negative for nausea, vomiting, constipation and diarrhea.   Genitourinary: Positive for dysuria, frequency, urgency and penile discharge. Negative for urine decreased, flank pain, bladder incontinence, bed wetting, hematuria, history of kidney stones, genital trauma, painful intercourse, genital sore, painful ejaculation, penile pain, penile swelling, scrotal swelling, testicular pain and pelvic pain.   Musculoskeletal: Negative for joint pain, joint swelling, muscle cramps and muscle ache.   Skin: Negative for color change, pale and rash.   Allergic/Immunologic: Negative for seasonal allergies.   Neurological: Negative for dizziness, history of vertigo, light-headedness, passing out and headaches.   Hematologic/Lymphatic: Negative for swollen lymph nodes, easy bruising/bleeding and history of blood clots. Does not bruise/bleed easily.   Psychiatric/Behavioral: Negative for nervous/anxious, sleep disturbance and depression. The patient is not nervous/anxious.        Objective:      Physical Exam   Constitutional: He is oriented to person, place, and time. He appears well-developed. No distress.   HENT:   Head: Normocephalic and atraumatic.   Ears:   Right Ear: External ear normal.   Left Ear: External ear normal.   Nose: Nose normal. No nasal deformity. No epistaxis.   Mouth/Throat: Oropharynx is clear and moist and mucous membranes are normal.   Eyes: Conjunctivae and lids are normal.   Neck: Trachea normal, normal range of motion and phonation normal. Neck supple.   Cardiovascular: Normal rate, regular rhythm and normal heart sounds.   No murmur heard.  Pulmonary/Chest: Effort normal and breath sounds normal. No stridor. He has no decreased breath sounds. He has no wheezes. He has no rhonchi. He has no rales.   Abdominal: Soft. Normal appearance and bowel sounds are normal. He exhibits no distension. There is no abdominal tenderness. There is no rebound, no guarding, no tenderness at McBurney's point and negative  Jameson's sign.   Musculoskeletal: Normal range of motion.      Right lower leg: No edema.      Left lower leg: No edema.   Neurological: He is alert and oriented to person, place, and time. He has normal reflexes.   Skin: Skin is warm, dry, intact and not diaphoretic. Psychiatric: His speech is normal and behavior is normal. Judgment and thought content normal.   Nursing note and vitals reviewed.        Results for orders placed or performed in visit on 08/04/20   POCT Urinalysis, Dipstick, Automated, W/O Scope   Result Value Ref Range    POC Blood, Urine Positive (A) Negative    POC Bilirubin, Urine Negative Negative    POC Urobilinogen, Urine NORMAL 0.3 - 2.2    POC Ketones, Urine Negative Negative    POC Protein, Urine Negative Negative    POC Nitrates, Urine Negative Negative    POC Glucose, Urine Negative Negative    pH, UA 6.0     POC Specific Gravity, Urine 1.015 1.003 - 1.029    POC Leukocytes, Urine Positive (A) Negative       Assessment:       1. Acute cystitis without hematuria    2. Dysuria    3. Penile discharge        Plan:         Acute cystitis without hematuria    Dysuria  -     POCT Urinalysis, Dipstick, Automated, W/O Scope  -     Urine culture  -     C. trachomatis/N. gonorrhoeae by AMP DNA [facility]; Urine  -     Trichomonas vaginalis, RNA, Qual, Urine (Males Only)    Penile discharge    Other orders  -     cefTRIAXone injection 250 mg  -     lidocaine HCL 10 mg/ml (1%) injection 1 mL  -     azithromycin (ZITHROMAX) 500 MG tablet; Take 2 tablets (1,000 mg total) by mouth once. for 1 dose  Dispense: 2 tablet; Refill: 0  -     sulfamethoxazole-trimethoprim 800-160mg (BACTRIM DS) 800-160 mg Tab; Take 1 tablet by mouth 2 (two) times daily. for 7 days  Dispense: 14 tablet; Refill: 0      Patient Instructions   PLEASE READ YOUR DISCHARGE INSTRUCTIONS ENTIRELY AS IT CONTAINS IMPORTANT INFORMATION.  Testing was sent to the lab today.  We will call to discuss your results in 3-5 days.  Please do not start  the Bactrim until tomorrow.  Take the azithromycin tonight.  - Rest.    - Drink plenty of fluids.    - Tylenol or Ibuprofen as directed as needed for fever/pain.    - If you were prescribed antibiotics, please take them to completion.  - If you are female and on birth control pills - please use additional methods of contraception to prevent pregnancy while on antibiotics and for one cycle after.   - If you were prescribed a narcotic medication or muscle relaxer, do not drive or operate heavy equipment or machinery while taking these medications, as they can cause drowsiness.   - If you smoke, please stop smoking.  -You must understand that you've received an Urgent Care treatment only and that you may be released before all your medical problems are known or treated. You, the patient, will    arrange for follow up care as instructed. Please arrange follow up with your primary medical clinic as soon as possible.   - Follow up with your PCP or specialty clinic as directed in the next 1-2 weeks if not improved or as needed.  You can call [phone] to schedule an appointment with the appropriate provider.    - Please return to Urgent Care or to the Emergency Department if your symptoms worsen.    Patient aware and verbalized understanding.    Bladder Infection, Male (Adult)    You have a bladder infection.  Urine is normally free of bacteria. But bacteria can get into the urinary tract from the skin around the rectum or it may travel in the blood from elsewhere in the body.  This is called a urinary tract infection (UTI). An infection can occur anywhere in the urinary tract. It could be in a kidney (pyelonephritis)or in the bladder (cystitis) and urethra (urethritis). The urethra is the tube that drains the urine from the bladder through the tip of the penis.  The most common place for a UTI is in the bladder. This is called a bladder infection. Most bladder infections are easily treated. They are not serious  unless the infection spreads up to the kidney.  The terms bladder infection, UTI, and cystitis are often used to describe the same thing, but they arent always the same. Cystitis is an inflammation of the bladder. The most common cause of cystitis is an infection.   Keep in mind:  · Infections in the urine are called UTIs.  · Cystitis is usually caused by a UTI.  · Not all UTIs and cases of cystitis are bladder infections.  · Bladder infections are the most common type of cystitis.  Symptoms of a bladder infection  The infection causes inflammation in the urethra and bladder. This inflammation causes many of the symptoms. The most common symptoms of a bladder infection are:  · Pain or burning when urinating  · Having to go more often than usual  · Feeling like you need to go right away  · Only a small amount comes out  · Blood in urine  · Discomfort in your belly (abdomen), usually in the lower abdomen, above the pubic bone  · Cloudy, strong, or bad smelling urine  · Unable to urinate (retention)  · Urinary incontinence  · Fever  · Loss of appetite  Older adults may also feel confused.  Causes of a bladder infection  Bladder infections are not contagious. You can't get one from someone else, from a toilet seat, or from sharing a bath.  The most common cause of bladder infections is bacteria from the bowels. The bacteria get onto the skin around the opening of the urethra. From there they can get into the urine and travel up to the bladder. This causes inflammation and an infection. This usually happens because of:  · An enlarged prostate  · Poor cleaning of the genitals  · Procedures that put a tube in your bladder, like a Hood catheter  · Bowel incontinence  · Older age  · Not emptying your bladder (The urine stays there, giving the bacteria a chance to grow.)  · Dehydration (This allows urine to stay in the bladder longer.)  · Constipation (This can cause the bowels to push on the bladder or urethra and keep the  bladder from emptying.)  Treatment  Bladder infections are treated with antibiotics. They usually clear up quickly without complications. Treatment helps prevent a more serious kidney infection.  Medicines  Medicines can help in the treatment of a bladder infection:  · You may have been given phenazopyridine to ease burning when you urinate. It will cause your urine to be bright orange. It can stain clothing.  · You may have been prescribed antibiotics. Take this medicine until you have finished it, even if you feel better. Taking all of the medicine will make sure the infection has cleared.  You can use acetaminophen or ibuprofen for pain, fever, or discomfort, unless another medicine was prescribed. You can also alternate them, or use both together. They work differently and are a different class of medicines, so taking them together is not an overdose. If you have chronic liver or kidney disease, talk with your healthcare provider before using these medicines. Also talk with your provider if youve had a stomach ulcer or GI bleeding or are taking blood thinner medicines.  Home care  Here are some guidelines to help you care for yourself at home:  · Drink plenty of fluids, unless your healthcare provider told you not to. Fluids will prevent dehydration and flush out your bladder.  · Use good personal hygiene. Wipe from front to back after using the toilet, and clean your penis regularly. If you arent circumcised, retract the foreskin when cleaning.  · Urinate more frequently, and dont try to hold it in for long periods of time, if possible.  · Wear loose-fitting clothes and cotton underwear. Avoid tight-fitting pants. This helps keep you clean and dry.  · Change your diet to prevent constipation. This means eating more fresh foods and more fiber, and less junk and fatty foods.  · Avoid sex until your symptoms are gone.  · Avoid caffeine, alcohol, and spicy foods. These can irritate the bladder.  Follow-up  care  Follow up with your healthcare provider, or as advised if all symptoms have not cleared up within 5 days. It is important to keep your follow-up appointment. You can talk with your provider to see if you need more tests of the urinary tract. This is especially important if you have infections that keep coming back.  If a culture was done, you will be told if your treatment needs to be changed. If directed, you can call to find out the results.  If X-rays were taken, you will be told of any findings that may affect your care.  Call 911  Call 911 if any of these occur:  · Trouble breathing  · Difficulty waking up  · Feeling confused  · Fainting or loss of consciousness  · Rapid heart rate  When to seek medical advice  Call your healthcare provider right away if any of these occur:  · Fever of 100.4ºF (38ºC) or higher, or as directed by your healthcare provider  · Your symptoms dont improve after 2 days of treatment  · Back or abdominal pain that gets worse  · Repeated vomiting, or you arent able to keep medicine down  · Weakness or dizziness  Date Last Reviewed: 10/1/2016  © 8046-0333 [employer]. [address] [place] [address]. All rights reserved. This information is not intended as a substitute for professional medical care. Always follow your healthcare professional's instructions.        Urethritis Due to Gonorrhea or Chlamydia (Adult male)    You have urethritis. This is an inflammation in the urethra. The urethra is the tube between the bladder and the tip of the penis. Urine drains out of the body through the urethra. There are 2 main types of this condition:  · Gonococcal urethritis () is an infection caused by gonorrhea.  · Non-gonococcal urethritis (TEOFILO) is an infection that is usually caused by chlamydia. Other infections can also be the cause.  Women often have no symptoms. Men are more likely to have symptoms, but may not. Symptoms can start within 1 week after  infection, but can take a month or more, if they even occur. Some symptoms are:  · Burning or pain when urinating  · Irritation in the penis  · Pus discharge from the penis  · Pain and possible swelling in one or both testicles  Infections in the urethra are usually caused by sexually transmitted diseases, or STDs. The most common infections are gonorrhea, chlamydia, or both.  Gonorrhea infections  Gonococcal urethritis () is an infection of the urethra. It is caused by gonorrhea. Gonorrhea is a sexually transmitted infection (STI). Gonorrhea can also be in other areas of the body. This can cause:  · Rectal pain and discharge  · Throat infection  · Eye infections (conjunctivitis)  Without treatment, the infection can get worse and spread to other parts of your body. The infection can cause rashes, arthritis, and infections in your joints, heart, and brain.  Non-gonococcal infections, or TEOFILO infections  Non-gonococcal urethritis (TEOFILO) is an infection of the urethra. It is usually caused by chlamydia. Symptoms may clear up in a few weeks or months, even without treatment. However, without treatment, the bacteria that cause TEOFILO can stay in the urethra. This means that even if symptoms clear, you can still have an infection. You can spread it to others if you are not treated.  Urethritis caused by an infection can be cured, but it needs to be treated with antibiotics. If you don't get treated, you can give it to someone else. If you give it to a woman, it can cause a serious pelvic infection and infertility.  It is important to remember that you can have an infection without symptoms. For this reason, your sexual partner(s) needs to be treated, even if he or she has no symptoms. If he or she is not treated, and you continue to have sex, you will be infected again. Your partner(s) should contact his or her own healthcare provider to be examined and treated. An urgent care clinic or the Public Health Department can also  do this.  Home care  The following guidelines will help you care for yourself at home:  · Take all the antibiotics you were given until they are used up. It is important to finish them, even if you are feeling better. This ensures the infection is completely cleared up.  · No sex until both you and your partner(s) have finished all the antibiotics, and your healthcare provider says you are no longer contagious.  · You can take acetaminophen or ibuprofen for pain, unless you were given a different pain medicine. If you have chronic liver or kidney disease or have ever had a stomach ulcer or GI bleeding, or are taking blood thinners, talk with your healthcare provider before using these medicines.  · Aspirin should never be used in anyone under 18 years of age who has a fever.  · Learn about and use safe sex practices. The safest sex is with a partner who has tested negative and only has sex with you. Condoms can keep some STDs from spreading, including gonorrhea, chlamydia and HIV, but are not a guarantee.  Follow-up care  Follow up with your healthcare provider, or as advised. If a culture test was taken, you may call for the results as directed. Another culture test should be done 4 to 6 weeks after treatment to be sure the infection is gone. Follow up with your healthcare provider or the Public Health Department for a complete STD screening, including HIV testing. For more information about STDs, contact CDC-INFO at 700-961-4464.  When to seek medical advice  Call your healthcare provider right away if any of these occur:  · No improvement after 3 days of treatment, although you can have some symptoms longer  · Unable to urinate  · Rash or joint pain  · Painful sores on the penis  · Enlarged painful lymph nodes (lumps) in the groin  · Testicle pain or swelling of the scrotum  Date Last Reviewed: 10/1/2016  © 5817-1850 [employer]. [address] [place] [address], [place] [address]. All rights reserved. This  information is not intended as a substitute for professional medical care. Always follow your healthcare professional's instructions.

## 2020-08-06 LAB — BACTERIA UR CULT: NO GROWTH

## 2020-08-07 LAB
C TRACH DNA SPEC QL NAA+PROBE: NOT DETECTED
N GONORRHOEA DNA SPEC QL NAA+PROBE: DETECTED

## 2020-08-08 ENCOUNTER — TELEPHONE (OUTPATIENT)
Dept: URGENT CARE | Facility: CLINIC | Age: 25
End: 2020-08-08

## 2020-08-08 NOTE — TELEPHONE ENCOUNTER
1. Plan to wear the LibrePro sensor for 14 days. It is okay to shower, bathe, and swim (up to 3 feet deep for 30 minutes).  LibrePro can be removed on 8/7 (keep in a safe spot).    3. Keep a log of what you eat and drink, and exercise you do while you are wearing the sensor.    3. Do not cover the sensor with extra adhesive (the small hole in the center of the sensor must remain uncovered)    4. Use a little extra care, especially when getting dressed or exercising, to avoid accidentally loosening or removing the sensor.     5. Remove the sensor if you need to have an MRI or CT scan.     Return the sensor to the [facility] on 8/13.    Follow-up appointment: 8/13    [facility] for the [facility]:  For Your Diabetes Education and Nutrition Appointments Call:  [phone]   For Diabetes Education or Nutrition Related Questions:   Phone: [phone]  E-mail: [email or web address]  Fax: [phone]   If you need a medication refill please contact your pharmacy. Please allow 3 business days for your refills to be completed.    Instructions for emailing the Diabetes Educators    If you need to communicate a non-urgent message to a Diabetes Educator via email, please send to [email or web address].    Please follow the following email guidelines:    Subject line: Secure: your clinic name (example: Secure: [facility])  In the email please include: First name, middle initial, last name and date of birth.    We will be in touch with you within one (1) business day.     Attempted to call regarding positive gonorrhea.  Treated appropriately.  Left voicemail with call back number

## 2020-08-09 ENCOUNTER — TELEPHONE (OUTPATIENT)
Dept: URGENT CARE | Facility: CLINIC | Age: 25
End: 2020-08-09

## 2020-08-09 LAB
T VAGINALIS RRNA SPEC QL NAA+PROBE: NOT DETECTED
TRICHOMONAS VAGINALIS RNA, QUAL, SOURCE: NORMAL

## 2020-08-12 ENCOUNTER — TELEPHONE (OUTPATIENT)
Dept: URGENT CARE | Facility: CLINIC | Age: 25
End: 2020-08-12

## 2020-08-12 NOTE — TELEPHONE ENCOUNTER
Third attempt to contact patient.  He did not answer the phone.  Voicemail was left for him to contact Clinic.     8/4/2020 Negative urine culture, trich, and chlamydia  + gonorrhoeae     Patient was treated appropriately at that time.  We will send a certified letter to his address on file.  This was communicated with [facility] staff.

## 2020-09-16 ENCOUNTER — TELEPHONE (OUTPATIENT)
Dept: PHARMACY | Facility: CLINIC | Age: 25
End: 2020-09-16

## 2020-09-18 ENCOUNTER — PATIENT MESSAGE (OUTPATIENT)
Dept: PHARMACY | Facility: CLINIC | Age: 25
End: 2020-09-18

## 2020-09-23 ENCOUNTER — PATIENT MESSAGE (OUTPATIENT)
Dept: PHARMACY | Facility: CLINIC | Age: 25
End: 2020-09-23

## 2020-10-15 ENCOUNTER — OFFICE VISIT (OUTPATIENT)
Dept: URGENT CARE | Facility: CLINIC | Age: 25
End: 2020-10-15
Payer: MEDICAID

## 2020-10-15 VITALS
TEMPERATURE: 99 F | WEIGHT: 142 LBS | SYSTOLIC BLOOD PRESSURE: 122 MMHG | HEART RATE: 96 BPM | BODY MASS INDEX: 19.88 KG/M2 | OXYGEN SATURATION: 98 % | DIASTOLIC BLOOD PRESSURE: 89 MMHG | RESPIRATION RATE: 16 BRPM | HEIGHT: 71 IN

## 2020-10-15 DIAGNOSIS — R05.9 COUGH: Primary | ICD-10-CM

## 2020-10-15 DIAGNOSIS — Z03.818 ENCNTR FOR OBS FOR SUSP EXPSR TO OTH BIOLG AGENTS RULED OUT: ICD-10-CM

## 2020-10-15 LAB
CTP QC/QA: YES
SARS-COV-2 RDRP RESP QL NAA+PROBE: NEGATIVE

## 2020-10-15 PROCEDURE — 99213 PR OFFICE/OUTPT VISIT, EST, LEVL III, 20-29 MIN: ICD-10-PCS | Mod: S$GLB,,, | Performed by: FAMILY MEDICINE

## 2020-10-15 PROCEDURE — 99213 OFFICE O/P EST LOW 20 MIN: CPT | Mod: S$GLB,,, | Performed by: FAMILY MEDICINE

## 2020-10-15 PROCEDURE — U0002: ICD-10-PCS | Mod: QW,S$GLB,, | Performed by: FAMILY MEDICINE

## 2020-10-15 PROCEDURE — U0002 COVID-19 LAB TEST NON-CDC: HCPCS | Mod: QW,S$GLB,, | Performed by: FAMILY MEDICINE

## 2020-10-15 NOTE — PROGRESS NOTES
"Subjective:       Patient ID: Alcides Pascal Jr. is a 25 y.o. male.    Vitals:  height is 5' 11" (1.803 m) and weight is 64.4 kg (142 lb). His temperature is 99.1 °F (37.3 °C). His blood pressure is 122/89 and his pulse is 96. His respiration is 16 and oxygen saturation is 98%.     Chief Complaint: Cough    Pt /o dry cough and sore throat with covid exposure on Thursday. Pt took Claritin D for mild relief. He would like to be tested today.     Cough  This is a new problem. The current episode started in the past 7 days. The problem has been unchanged. The problem occurs hourly. The cough is non-productive. Associated symptoms include a sore throat. Pertinent negatives include no chest pain, chills, fever, headaches, myalgias, rash or shortness of breath. Treatments tried: Claritin D. The treatment provided no relief.       Constitution: Negative for chills, fatigue and fever.   HENT: Positive for sore throat. Negative for congestion.    Neck: Negative for painful lymph nodes.   Cardiovascular: Negative for chest pain and leg swelling.   Eyes: Negative for double vision and blurred vision.   Respiratory: Positive for cough. Negative for shortness of breath.    Gastrointestinal: Negative for nausea, vomiting and diarrhea.   Genitourinary: Negative for dysuria, frequency and urgency.   Musculoskeletal: Negative for joint pain, joint swelling, muscle cramps and muscle ache.   Skin: Negative for color change, pale and rash.   Allergic/Immunologic: Negative for seasonal allergies.   Neurological: Negative for dizziness, history of vertigo, light-headedness, passing out and headaches.   Hematologic/Lymphatic: Negative for swollen lymph nodes, easy bruising/bleeding and history of blood clots. Does not bruise/bleed easily.   Psychiatric/Behavioral: Negative for nervous/anxious, sleep disturbance and depression. The patient is not nervous/anxious.        Objective:      Physical Exam   Constitutional: He is oriented to " person, place, and time. He appears well-developed. He is cooperative.  Non-toxic appearance. He does not appear ill. No distress.   HENT:   Head: Normocephalic and atraumatic.   Ears:   Right Ear: Hearing, tympanic membrane, external ear and ear canal normal.   Left Ear: Hearing, tympanic membrane, external ear and ear canal normal.   Nose: Nose normal. No mucosal edema, rhinorrhea or nasal deformity. No epistaxis. Right sinus exhibits no maxillary sinus tenderness and no frontal sinus tenderness. Left sinus exhibits no maxillary sinus tenderness and no frontal sinus tenderness.   Mouth/Throat: Uvula is midline, oropharynx is clear and moist and mucous membranes are normal. No trismus in the jaw. Normal dentition. No uvula swelling. No posterior oropharyngeal erythema.   Eyes: Conjunctivae and lids are normal. Right eye exhibits no discharge. Left eye exhibits no discharge. No scleral icterus.   Neck: Trachea normal, normal range of motion, full passive range of motion without pain and phonation normal. Neck supple.   Cardiovascular: Normal rate, regular rhythm, normal heart sounds and normal pulses.   Pulmonary/Chest: Effort normal and breath sounds normal. No respiratory distress.   Abdominal: Soft. Normal appearance and bowel sounds are normal. He exhibits no distension, no pulsatile midline mass and no mass. There is no abdominal tenderness.   Musculoskeletal: Normal range of motion.         General: No deformity.   Neurological: He is alert and oriented to person, place, and time. He exhibits normal muscle tone. Coordination normal.   Skin: Skin is warm, dry, intact, not diaphoretic and not pale. Psychiatric: His speech is normal and behavior is normal. Judgment and thought content normal.   Nursing note and vitals reviewed.        Assessment:       1. Cough    2. Encntr for obs for susp expsr to ot biol agents ruled out        Plan:         Cough  -     POCT COVID-19 Rapid Screening    Encntr for obs for  susp expsr to oth biolg agents ruled out          Patient advised to monitor vitals Tylenol as needed Claritin continue rest and fluids

## 2020-10-24 ENCOUNTER — SPECIALTY PHARMACY (OUTPATIENT)
Dept: PHARMACY | Facility: CLINIC | Age: 25
End: 2020-10-24

## 2020-10-24 DIAGNOSIS — Z20.6 CONTACT WITH AND (SUSPECTED) EXPOSURE TO HUMAN IMMUNODEFICIENCY VIRUS (HIV): Primary | ICD-10-CM

## 2020-10-24 NOTE — TELEPHONE ENCOUNTER
Specialty Pharmacy - Refill Coordination    Specialty Medication Orders Linked to Encounter      Most Recent Value   Medication #1  emtricitabine-tenofovir alafen (DESCOVY) 200-25 mg Tab (Order#[id number], Rx#[id number])          Refill Questions - Documented Responses      Most Recent Value   Relationship to patient of person spoken to?  Self   HIPAA/medical authority confirmed?  Yes   Any changes in contact preferences or allowed representatives?  No   Has the patient had any insurance changes?  No   Has the patient had any changes to specialty medication, dose, or instructions?  No   Has the patient started taking any new medications, herbals, or supplements?  No   Has the patient been diagnosed with any new medical conditions?  No   Does the patient have any new allergies to medications or foods?  No   Does the patient have any concerns about side effects?  No   Can the patient store medication/sharps container properly (at the correct temperature, away from children/pets, etc.)?  Yes   Can the patient call emergency services (911) in the event of an emergency?  Yes   Does the patient have any concerns or questions about taking or administering this medication as prescribed?  No   How many doses did the patient miss in the past 4 weeks or since the last fill?  0   How many doses does the patient have on hand?  4   How many days does the patient report on hand quantity will last?  4   Does the number of doses/days supply remaining match pharmacy expected amounts?  Yes   How will the patient receive the medication?  Pickup   When does the patient need to receive the medication?  10/24/20   Expected Copay ($)  0   Would patient like to speak to a pharmacist?  No   Do you want to trigger an intervention?  No   Do you want to trigger an additional referral task?  No   Refill activity completed?  Yes   Refill activity plan  Refill scheduled   Shipment/Pickup Date:  10/24/20          Current Outpatient Medications    Medication Sig    ACZONE 7.5 % GlwP APPLY TO THE AFFECTED AREA ON FACE EVERY DAY    emtricitabine-tenofovir alafen (DESCOVY) 200-25 mg Tab Take 1 tablet by mouth once daily.    FABIOR 0.1 % Foam APPLY TO THE AFFECTED AREA ON BACK EVERY EVENING    FLUZONE QUAD [id number], PF, 60 mcg (15 mcg x 4)/0.5 mL Syrg TO BE ADMINISTERED BY PHARMACIST FOR IMMUNIZATION   Last reviewed on 10/24/2020 11:24 AM by Kaley Graham    Review of patient's allergies indicates:  No Known Allergies Last reviewed on  10/24/2020 11:24 AM by Kaley Graham      Tasks added this encounter   10/24/2020 - Refill Call  10/25/2020 - Pickup Reminder   Tasks due within next 3 months   10/22/2020 - Clinical - Follow Up Assesement (90 day)     Kaley Graham  [facility]  [address] [place] [address]  Phone: [phone]  Fax: [phone]

## 2020-10-24 NOTE — TELEPHONE ENCOUNTER
Patient picked up 10/24    Nolan Johnston, PharmD  Clinical Pharmacist   [facility]   P: [phone]

## 2020-11-16 ENCOUNTER — SPECIALTY PHARMACY (OUTPATIENT)
Dept: PHARMACY | Facility: CLINIC | Age: 25
End: 2020-11-16

## 2020-11-16 NOTE — TELEPHONE ENCOUNTER
Specialty Pharmacy - Clinical Reassessment  Specialty Pharmacy - Refill Coordination    Specialty Medication Orders Linked to Encounter      Most Recent Value   Medication #1  emtricitabine-tenofovir alafen (DESCOVY) 200-25 mg Tab (Order#[id number], Rx#[id number])        Subjective    Alcides Pascal Jr. is a 25 y.o. male, who is followed by the specialty pharmacy service for management and education.    Recent Encounters     Date Type Provider Description    11/16/2020 Specialty Pharmacy Kira Andres, Skip Follow-up Clinical Reassessment; Refill Coordination    10/24/2020 Specialty Pharmacy Kaley Graham Refill Coordination        Clinical call attempts since last clinical assessment   10/23/2020  7:55 PM - Specialty Pharmacy - Clinical Reassessment by Kira Andres, Skip     Today he received follow up education for his specialty medication(s).    Current Outpatient Medications   Medication Sig    ACZONE 7.5 % GlwP APPLY TO THE AFFECTED AREA ON FACE EVERY DAY    emtricitabine-tenofovir alafen (DESCOVY) 200-25 mg Tab Take 1 tablet by mouth once daily.    FABIOR 0.1 % Foam APPLY TO THE AFFECTED AREA ON BACK EVERY EVENING   Last reviewed on 11/16/2020  2:05 PM by Kira Andres, PharmD    Review of patient's allergies indicates:  No Known AllergiesLast reviewed on  10/24/2020 11:24 AM by Kaley Graham    Drug Interactions    Drug interactions evaluated: yes  Clinically relevant drug interactions identified: no  Provided the patient with educational material regarding drug interactions: not applicable       Medication Adherence    What concerns does the patient have in regards to their medications: NONE  Patient reported X missed doses in the last month: 1  Any gaps in refill history greater than 2 weeks in the last 3 months: no  Demonstrates understanding of importance of adherence: yes  Informant: patient  Reliability of informant: reliable  Provider-estimated medication adherence level:  %  Reasons for non-adherence: no problems identified   Other adherence tool: DAILY ROUTINE    Other support network: self   Confirmed plan for next specialty medication refill: pick-up at pharmacy  Refills needed for supportive medications: not needed       Adverse Effects    *All other systems reviewed and are negative       Assessment Questions - Documented Responses      Most Recent Value   Assessment   Medication Reconciliation completed for patient  Yes   During the past 4 weeks, has patient missed any activities due to condition or medication?  No   During the past 4 weeks, did patient have any of the following urgent care visits?  None   Goals of Therapy Status  Achieving   Welcome packet contents reviewed and discussed with patient?  Yes   Assesment completed?  Yes   Plan  Therapy continued   Do you need to open a clinical intervention (i-vent)?  Yes   Do you want to schedule first shipment?  Yes   Medication #1 Assessment Info   Patient status  Existing medication, Exisiting to [facility]   Is this medication appropriate for the patient?  Yes   Is this medication effective?  Yes        Refill Questions - Documented Responses      Most Recent Value   Relationship to patient of person spoken to?  Self   HIPAA/medical authority confirmed?  Yes   Any changes in contact preferences or allowed representatives?  Yes   Has the patient had any insurance changes?  No   Has the patient had any changes to specialty medication, dose, or instructions?  No   Has the patient started taking any new medications, herbals, or supplements?  No   Has the patient been diagnosed with any new medical conditions?  No   Does the patient have any new allergies to medications or foods?  No   Does the patient have any concerns about side effects?  No   Can the patient store medication/sharps container properly (at the correct temperature, away from children/pets, etc.)?  Yes   Can the patient call emergency services (911) in the event of  "an emergency?  Yes   Does the patient have any concerns or questions about taking or administering this medication as prescribed?  No   How many doses did the patient miss in the past 4 weeks or since the last fill?  1   Reported reason for missed doses:  Simply forgot   How many doses does the patient have on hand?  10   How many days does the patient report on hand quantity will last?  10   Does the number of doses/days supply remaining match pharmacy expected amounts?  Yes   Does the patient feel that this medication is effective?  Yes   During the past 4 weeks, has patient missed any activities due to condition or medication?  No   During the past 4 weeks, did patient have any of the following urgent care visits?  None   How will the patient receive the medication?  Pickup   When does the patient need to receive the medication?  11/19/20   Shipping Address  Home   Address in [facility] confirmed and updated if neccessary?  Yes   Expected Copay ($)  0   Is the patient able to afford the medication copay?  Yes   Payment Method  zero copay   Days supply of Refill  30   Would patient like to speak to a pharmacist?  No   Do you want to trigger an intervention?  No   Do you want to trigger an additional referral task?  No   Refill activity completed?  Yes   Refill activity plan  Refill scheduled   Shipment/Pickup Date:  11/19/20          Objective    He has a past medical history of ADHD (attention deficit hyperactivity disorder).    Tried/failed medications: Truvada    BP Readings from Last 4 Encounters:   10/15/20 122/89   08/04/20 133/72   06/23/20 116/84   11/27/19 (!) 140/80     Ht Readings from Last 4 Encounters:   10/15/20 5' 11" (1.803 m)   08/04/20 5' 11" (1.803 m)   06/23/20 5' 11" (1.803 m)   11/27/19 5' 11" (1.803 m)     Wt Readings from Last 4 Encounters:   10/15/20 64.4 kg (142 lb)   08/04/20 64.4 kg (142 lb)   06/23/20 64.6 kg (142 lb 6.7 oz)   11/27/19 65.8 kg (145 lb)     Recent Labs   Lab " Result Units 20  0841   Creatinine mg/dL 0.9   ALT U/L 18   AST U/L 22     The goal of Pre-exposure Prophylaxis (PrEP) is to reduce the risk of HIV infection in those that are at substantial risk of acquiring HIV infection including:  · Men Who Have Sex with Men (MSM)  · Heterosexually Active Men and Women  · Injection Drug Users (IDU)    Goals of Therapy Status: Achieving    Assessment/Plan  Patient plans to continue therapy without changes      Indication, dosage, appropriateness, effectiveness, safety and convenience of his specialty medication(s) were reviewed today.     Patient Counseling    Counseled the patient on the following: doses and administration discussed, safe handling, storage, and disposal discussed, possible adverse effects and management discussed, possible drug and prescription drug interactions discussed, possible drug and OTC drug and food interactions discussed, lab monitoring and follow-up discussed, use of contraception discussed, therapeutic rationale discussed, cost of medications and cost implications discussed, adherence and missed doses discussed, pharmacy contact information discussed       Descovy refill confirmed and reassessment complete. Patient will pickup Descovy  refill on  at [facility] $0 copay- 004. Spoke to patient -- Confirmed 2 patient identifiers - name and . Dose, route, frequency appropriate for indication HIV PrEP Patient. No dose adjustment necessary. Patient followed MD Dr. Rodgers to [facility]. Per patient, last PrEP labs WNL. Will request from MD to further assess therapy appropriateness.    Patient has a week and a half's worth doses of Descovy remaining and takes it daily.  Pt reports they are not having any side effects so far. Reports 1 missed dose within the past month on election night, citing busy schedule that night. Counseled on adherence. No new medications, no new allergies or health conditions reported at this time.  Allergies reviewed and medication reconciliation complete--no DDIs (reviewed and documented in [facility] and [facility]).  Disease education deferred by patient. Patient counseled on importance of maintaining adherence and keeping lab appointments which were scheduled. All questions answered and addressed to patients satisfaction. Advised to call [facility] and provider if any issues arise.  Pt verbalized understanding.        Tasks added this encounter   2/7/2021 - Clinical - Follow Up Assesement (90 day)  12/9/2020 - Refill Call (Auto Added)  11/20/2020 - Pickup Reminder   Tasks due within next 3 months   No tasks due.     Kira Andres, PharmD  [facility] - Specialty Pharm[facility]  [address] [place] [id number]  Phone: [phone]  Fax: [phone]

## 2020-12-09 ENCOUNTER — SPECIALTY PHARMACY (OUTPATIENT)
Dept: PHARMACY | Facility: CLINIC | Age: 25
End: 2020-12-09

## 2020-12-16 NOTE — TELEPHONE ENCOUNTER
[facility] received new Rx for Truvada for PrEP. Patient has been filling Descovy for the past 3 months. Will outreach to [facility] to clarify if patient is switching back to Truvada or if Descovy is still intended. Left a message with [facility]  with [facility] callback number. Also left a message for patient requesting callback to [facility]. If patient returns call plan to ask about if therapy change back to Truvada was intended or if he is to remain on Descovy. .

## 2020-12-22 ENCOUNTER — PATIENT MESSAGE (OUTPATIENT)
Dept: PHARMACY | Facility: CLINIC | Age: 25
End: 2020-12-22

## 2020-12-22 NOTE — TELEPHONE ENCOUNTER
[facility] received new Rx for Descovy and provider (GIULIANA Rodgers) confirmed that patient should continue Descovy. Truvada Rx has been DC'd. [facility] attempted to contact patient for Descovy refill. NA, LVM for call back. [employer] message sent. $0 copay.

## 2020-12-28 ENCOUNTER — SPECIALTY PHARMACY (OUTPATIENT)
Dept: PHARMACY | Facility: CLINIC | Age: 25
End: 2020-12-28

## 2020-12-28 NOTE — TELEPHONE ENCOUNTER
Specialty Pharmacy - Refill Coordination    Specialty Medication Orders Linked to Encounter      Most Recent Value   Medication #1  emtricitabine-tenofovir alafen (DESCOVY) 200-25 mg Tab (Order#[id number], Rx#[id number])          Refill Questions - Documented Responses      Most Recent Value   Relationship to patient of person spoken to?  Self   HIPAA/medical authority confirmed?  Yes   Any changes in contact preferences or allowed representatives?  No   Has the patient had any insurance changes?  No   Has the patient had any changes to specialty medication, dose, or instructions?  No   Has the patient started taking any new medications, herbals, or supplements?  No   Has the patient been diagnosed with any new medical conditions?  No   Does the patient have any new allergies to medications or foods?  No   Does the patient have any concerns about side effects?  No   Can the patient store medication/sharps container properly (at the correct temperature, away from children/pets, etc.)?  Yes   Can the patient call emergency services (911) in the event of an emergency?  Yes   Does the patient have any concerns or questions about taking or administering this medication as prescribed?  No   How many doses did the patient miss in the past 4 weeks or since the last fill?  0   How many doses does the patient have on hand?  0   How many days does the patient report on hand quantity will last?  0   Does the number of doses/days supply remaining match pharmacy expected amounts?  Yes   Does the patient feel that this medication is effective?  Yes   During the past 4 weeks, has patient missed any activities due to condition or medication?  No   During the past 4 weeks, did patient have any of the following urgent care visits?  None   How will the patient receive the medication?  Pickup   When does the patient need to receive the medication?  12/28/20   Shipping Address  Home   Address in [facility] confirmed and updated if  neccessary?  Yes   Expected Copay ($)  0   Is the patient able to afford the medication copay?  Yes   Payment Method  zero copay   Days supply of Refill  90   Would patient like to speak to a pharmacist?  No   Do you want to trigger an intervention?  No   Do you want to trigger an additional referral task?  No   Refill activity completed?  Yes   Refill activity plan  Refill scheduled   Shipment/Pickup Date:  12/28/20          Current Outpatient Medications   Medication Sig    ACZONE 7.5 % GlwP APPLY TO THE AFFECTED AREA ON FACE EVERY DAY    emtricitabine-tenofovir alafen (DESCOVY) 200-25 mg Tab 1 tablet Once a day Orally 90 day(s)    FABIOR 0.1 % Foam APPLY TO THE AFFECTED AREA ON BACK EVERY EVENING   Last reviewed on 11/16/2020  2:05 PM by Kira Andres, PharmD    Review of patient's allergies indicates:  No Known Allergies Last reviewed on  10/24/2020 11:24 AM by Kaley Graham      Tasks added this encounter   3/21/2021 - Refill Call (Auto Added)  12/29/2020 - Pickup Reminder   Tasks due within next 3 months   2/7/2021 - Clinical - Follow Up Assesement (90 day)     GAVIOTA DRAPER  [facility] - [facility] Pharm[facility]  [address] [place] [address]  Phone: [phone]  Fax: [phone]

## 2021-01-21 NOTE — TELEPHONE ENCOUNTER
Lab fax request for Descovy. No response from provider from [facility]'s request for labwork after four separate attempts. Due to lack of provider response, will not continue request labs at this time.

## 2021-01-25 ENCOUNTER — SPECIALTY PHARMACY (OUTPATIENT)
Dept: PHARMACY | Facility: CLINIC | Age: 26
End: 2021-01-25

## 2021-02-27 ENCOUNTER — SPECIALTY PHARMACY (OUTPATIENT)
Dept: PHARMACY | Facility: CLINIC | Age: 26
End: 2021-02-27

## 2021-03-26 ENCOUNTER — PATIENT MESSAGE (OUTPATIENT)
Dept: PHARMACY | Facility: CLINIC | Age: 26
End: 2021-03-26

## 2021-03-27 ENCOUNTER — SPECIALTY PHARMACY (OUTPATIENT)
Dept: PHARMACY | Facility: CLINIC | Age: 26
End: 2021-03-27

## 2021-04-16 ENCOUNTER — PATIENT MESSAGE (OUTPATIENT)
Dept: RESEARCH | Facility: HOSPITAL | Age: 26
End: 2021-04-16

## 2021-04-28 ENCOUNTER — SPECIALTY PHARMACY (OUTPATIENT)
Dept: PHARMACY | Facility: CLINIC | Age: 26
End: 2021-04-28

## 2021-05-21 ENCOUNTER — PATIENT MESSAGE (OUTPATIENT)
Dept: PHARMACY | Facility: CLINIC | Age: 26
End: 2021-05-21

## 2021-05-24 ENCOUNTER — SPECIALTY PHARMACY (OUTPATIENT)
Dept: PHARMACY | Facility: CLINIC | Age: 26
End: 2021-05-24

## 2021-06-22 ENCOUNTER — PATIENT MESSAGE (OUTPATIENT)
Dept: PHARMACY | Facility: CLINIC | Age: 26
End: 2021-06-22

## 2021-06-23 ENCOUNTER — SPECIALTY PHARMACY (OUTPATIENT)
Dept: PHARMACY | Facility: CLINIC | Age: 26
End: 2021-06-23

## 2021-07-21 ENCOUNTER — PATIENT MESSAGE (OUTPATIENT)
Dept: PHARMACY | Facility: CLINIC | Age: 26
End: 2021-07-21

## 2021-07-23 ENCOUNTER — SPECIALTY PHARMACY (OUTPATIENT)
Dept: PHARMACY | Facility: CLINIC | Age: 26
End: 2021-07-23

## 2021-08-19 ENCOUNTER — SPECIALTY PHARMACY (OUTPATIENT)
Dept: PHARMACY | Facility: CLINIC | Age: 26
End: 2021-08-19

## 2021-08-19 ENCOUNTER — PATIENT MESSAGE (OUTPATIENT)
Dept: PHARMACY | Facility: CLINIC | Age: 26
End: 2021-08-19

## 2021-09-22 ENCOUNTER — SPECIALTY PHARMACY (OUTPATIENT)
Dept: PHARMACY | Facility: CLINIC | Age: 26
End: 2021-09-22

## 2021-09-22 DIAGNOSIS — Z20.6 CONTACT WITH AND (SUSPECTED) EXPOSURE TO HUMAN IMMUNODEFICIENCY VIRUS (HIV): Primary | ICD-10-CM

## 2021-10-18 ENCOUNTER — SPECIALTY PHARMACY (OUTPATIENT)
Dept: PHARMACY | Facility: CLINIC | Age: 26
End: 2021-10-18

## 2021-10-23 ENCOUNTER — PATIENT MESSAGE (OUTPATIENT)
Dept: PHARMACY | Facility: CLINIC | Age: 26
End: 2021-10-23
Payer: MEDICAID

## 2021-11-20 ENCOUNTER — PATIENT MESSAGE (OUTPATIENT)
Dept: PHARMACY | Facility: CLINIC | Age: 26
End: 2021-11-20
Payer: MEDICAID

## 2021-11-22 ENCOUNTER — SPECIALTY PHARMACY (OUTPATIENT)
Dept: PHARMACY | Facility: CLINIC | Age: 26
End: 2021-11-22
Payer: MEDICAID

## 2021-12-23 ENCOUNTER — SPECIALTY PHARMACY (OUTPATIENT)
Dept: PHARMACY | Facility: CLINIC | Age: 26
End: 2021-12-23
Payer: MEDICAID

## 2022-01-20 ENCOUNTER — SPECIALTY PHARMACY (OUTPATIENT)
Dept: PHARMACY | Facility: CLINIC | Age: 27
End: 2022-01-20
Payer: MEDICAID

## 2022-01-20 NOTE — TELEPHONE ENCOUNTER
Specialty Pharmacy - Refill Coordination    Specialty Medication Orders Linked to Encounter    Flowsheet Row Most Recent Value   Medication #1 emtricitabine-tenofovir alafen (DESCOVY) 200-25 mg Tab (Order#[id number], Rx#[id number])          Refill Questions - Documented Responses    Flowsheet Row Most Recent Value   Patient Availability and HIPAA Verification    Does patient want to proceed with activity? Yes   HIPAA/medical authority confirmed? Yes   Relationship to patient of person spoken to? Self   Refill Screening Questions    Changes to allergies? No   Changes to medications? No   New conditions since last clinic visit? No   Unplanned office visit, urgent care, ED, or hospital admission in the last 4 weeks? No   How does patient/caregiver feel medication is working? Excellent   Financial problems or insurance changes? No   How many doses of your specialty medications were missed in the last 4 weeks? 0   Would patient like to speak to a pharmacist? No   When does the patient need to receive the medication? 01/24/22   Refill Delivery Questions    How will the patient receive the medication? Delivery Carmita   When does the patient need to receive the medication? 01/24/22   Address in [facility] confirmed and updated if neccessary? No   Expected Copay ($) 0   Is the patient able to afford the medication copay? Yes   Payment Method zero copay   Days supply of Refill 30   Supplies needed? No supplies needed   Refill activity completed? Yes   Refill activity plan Refill scheduled   Shipment/Pickup Date: 01/21/22          Current Outpatient Medications   Medication Sig    ACZONE 7.5 % GlwP APPLY TO THE AFFECTED AREA ON FACE EVERY DAY    emtricitabine-tenofovir alafen (DESCOVY) 200-25 mg Tab TAKE 1 TABLET BY MOUTH DAILY.    FABIOR 0.1 % Foam APPLY TO THE AFFECTED AREA ON BACK EVERY EVENING   Last reviewed on 10/18/2021  3:22 PM by Arlette El    Review of patient's allergies indicates:  No Known  Allergies Last reviewed on  10/18/2021 3:22 PM by Arlette El      Tasks added this encounter   2/16/2022 - Refill Call (Auto Added)   Tasks due within next 3 months   No tasks due.     Kira Andres, PharmD  [facility]  [address] [place], [place] [address]  [facility] [address]  Phone: [phone]  Fax: [phone]

## 2022-02-11 ENCOUNTER — NURSE TRIAGE (OUTPATIENT)
Dept: ADMINISTRATIVE | Facility: CLINIC | Age: 27
End: 2022-02-11
Payer: MEDICAID

## 2022-02-11 NOTE — TELEPHONE ENCOUNTER
[facility] Rn  COVID POSITIVE 2/10/22   What can he take OTC.  UC  For any worsening symptoms to call [facility] RN  Patient VU.     Reason for Disposition   [1] COVID-19 diagnosed by doctor (or NP/PA) AND [2] mild symptoms (e.g., cough, fever, others) AND [3] no complications or SOB    Additional Information   Negative: SEVERE difficulty breathing (e.g., struggling for each breath, speaks in single words)   Negative: Difficult to awaken or acting confused (e.g., disoriented, slurred speech)   Negative: Bluish (or gray) lips or face now   Negative: Shock suspected (e.g., cold/pale/clammy skin, too weak to stand, low BP, rapid pulse)   Negative: Sounds like a life-threatening emergency to the triager   Negative: SEVERE or constant chest pain or pressure (Exception: mild central chest pain, present only when coughing)   Negative: MODERATE difficulty breathing (e.g., speaks in phrases, SOB even at rest, pulse 100-120)   Negative: Headache and stiff neck (can't touch chin to chest)   Negative: Chest pain or pressure   Negative: Patient sounds very sick or weak to the triager   Negative: MILD difficulty breathing (e.g., minimal/no SOB at rest, SOB with walking, pulse <100)   Negative: Fever > 103 F (39.4 C)   Negative: [1] Fever > 101 F (38.3 C) AND [2] over 60 years of age   Negative: [1] Fever > 100.0 F (37.8 C) AND [2] bedridden (e.g., nursing home patient, CVA, chronic illness, recovering from surgery)   Negative: HIGH RISK for severe COVID complications (e.g., age > 64 years, obesity with BMI > 25, pregnant, chronic lung disease or other chronic medical condition) (Exception: Already seen by PCP and no new or worsening symptoms.)   Negative: [1] HIGH RISK patient AND [2] influenza is widespread in the community AND [3] ONE OR MORE respiratory symptoms: cough, sore throat, runny or stuffy nose   Negative: [1] HIGH RISK patient AND [2] influenza exposure within the last 7 days AND [3] ONE OR MORE respiratory  symptoms: cough, sore throat, runny or stuffy nose   Negative: [1] COVID-19 infection suspected by caller or triager AND [2] mild symptoms (cough, fever, or others) AND [3] negative COVID-19 rapid test   Negative: [1] COVID-19 infection suspected by caller or triager AND [2] mild symptoms (cough, fever, or others) AND [3] has not gotten tested yet   Negative: Fever present > 3 days (72 hours)   Negative: [1] Fever returns after gone for over 24 hours AND [2] symptoms worse or not improved   Negative: [1] Continuous (nonstop) coughing interferes with work or school AND [2] no improvement using cough treatment per Care Advice   Negative: Cough present > 3 weeks   Negative: [1] COVID-19 diagnosed by positive lab test (e.g., PCR, rapid self-test kit) AND [2] NO symptoms (e.g., cough, fever, others)   Negative: [1] COVID-19 diagnosed by positive lab test (e.g., PCR, rapid self-test kit) AND [2] mild symptoms (e.g., cough, fever, others) AND [3] no complications or SOB    Protocols used: CORONAVIRUS (COVID-19) DIAGNOSED OR [id number]

## 2022-02-16 ENCOUNTER — PATIENT MESSAGE (OUTPATIENT)
Dept: PHARMACY | Facility: CLINIC | Age: 27
End: 2022-02-16
Payer: MEDICAID

## 2022-02-18 ENCOUNTER — SPECIALTY PHARMACY (OUTPATIENT)
Dept: PHARMACY | Facility: CLINIC | Age: 27
End: 2022-02-18
Payer: MEDICAID

## 2022-02-18 NOTE — TELEPHONE ENCOUNTER
Specialty Pharmacy - Refill Coordination    Specialty Medication Orders Linked to Encounter    Flowsheet Row Most Recent Value   Medication #1 emtricitabine-tenofovir alafen (DESCOVY) 200-25 mg Tab (Order#[id number], Rx#[id number])          Refill Questions - Documented Responses    Flowsheet Row Most Recent Value   Patient Availability and HIPAA Verification    Does patient want to proceed with activity? Yes   HIPAA/medical authority confirmed? Yes   Relationship to patient of person spoken to? Self   Refill Screening Questions    Changes to allergies? No   Changes to medications? No   New conditions since last clinic visit? No   Unplanned office visit, urgent care, ED, or hospital admission in the last 4 weeks? Yes   How does patient/caregiver feel medication is working? Good   Financial problems or insurance changes? No   How many doses of your specialty medications were missed in the last 4 weeks? 1   Would patient like to speak to a pharmacist? No   When does the patient need to receive the medication? 02/25/22   Refill Delivery Questions    How will the patient receive the medication? Pickup   When does the patient need to receive the medication? 02/25/22   Shipping Address Home   Address in [facility] confirmed and updated if neccessary? Yes   Expected Copay ($) 0   Is the patient able to afford the medication copay? Yes   Payment Method zero copay   Days supply of Refill 30   Supplies needed? No supplies needed   Refill activity completed? Yes   Refill activity plan Refill scheduled   Shipment/Pickup Date: 02/23/22          Current Outpatient Medications   Medication Sig    ACZONE 7.5 % GlwP APPLY TO THE AFFECTED AREA ON FACE EVERY DAY    emtricitabine-tenofovir alafen (DESCOVY) 200-25 mg Tab TAKE 1 TABLET BY MOUTH DAILY.    FABIOR 0.1 % Foam APPLY TO THE AFFECTED AREA ON BACK EVERY EVENING   Last reviewed on 10/18/2021  3:22 PM by Arlette El    Review of patient's allergies indicates:  No  Known Allergies Last reviewed on  10/18/2021 3:22 PM by Arlette El      Tasks added this encounter   No tasks added.   Tasks due within next 3 months   2/16/2022 - Refill Call (Auto Added)     [facility]  [address]  [place] [address]  Phone: [phone]  Fax: [phone]

## 2022-02-24 ENCOUNTER — PATIENT MESSAGE (OUTPATIENT)
Dept: PHARMACY | Facility: CLINIC | Age: 27
End: 2022-02-24
Payer: MEDICAID

## 2022-03-23 ENCOUNTER — SPECIALTY PHARMACY (OUTPATIENT)
Dept: PHARMACY | Facility: CLINIC | Age: 27
End: 2022-03-23
Payer: MEDICAID

## 2022-03-23 NOTE — TELEPHONE ENCOUNTER
Specialty Pharmacy - Refill Coordination    Specialty Medication Orders Linked to Encounter    Flowsheet Row Most Recent Value   Medication #1 emtricitabine-tenofovir alafen (DESCOVY) 200-25 mg Tab (Order#[id number], Rx#[id number])          Refill Questions - Documented Responses    Flowsheet Row Most Recent Value   Patient Availability and HIPAA Verification    Does patient want to proceed with activity? Yes   HIPAA/medical authority confirmed? Yes   Relationship to patient of person spoken to? Self   Refill Screening Questions    Changes to allergies? No   Changes to medications? No   New conditions since last clinic visit? No   Unplanned office visit, urgent care, ED, or hospital admission in the last 4 weeks? No   How does patient/caregiver feel medication is working? Very good   Financial problems or insurance changes? No   How many doses of your specialty medications were missed in the last 4 weeks? 0   Would patient like to speak to a pharmacist? No   When does the patient need to receive the medication? 03/27/22   Refill Delivery Questions    How will the patient receive the medication? Pickup   When does the patient need to receive the medication? 03/27/22   Expected Copay ($) 0   Is the patient able to afford the medication copay? Yes   Payment Method zero copay   Days supply of Refill 30   Supplies needed? No supplies needed   Refill activity completed? Yes   Refill activity plan Refill scheduled   Shipment/Pickup Date: 03/25/22          Current Outpatient Medications   Medication Sig    ACZONE 7.5 % GlwP APPLY TO THE AFFECTED AREA ON FACE EVERY DAY    emtricitabine-tenofovir alafen (DESCOVY) 200-25 mg Tab 1 tablet Orally Once a day 90 days    FABIOR 0.1 % Foam APPLY TO THE AFFECTED AREA ON BACK EVERY EVENING   Last reviewed on 10/18/2021  3:22 PM by Arlette El    Review of patient's allergies indicates:  No Known Allergies Last reviewed on  10/18/2021 3:22 PM by Arlette El      Tasks  added this encounter   4/22/2022 - Refill Call (Auto Added)  3/29/2022 - Pickup Reminder   Tasks due within next 3 months   No tasks due.     Rosita Harley, PharmD  [facility]  [address] [place], [place] [address]  [facility] [address]  Phone: [phone]  Fax: [phone]

## 2022-04-18 ENCOUNTER — PATIENT MESSAGE (OUTPATIENT)
Dept: ADMINISTRATIVE | Facility: OTHER | Age: 27
End: 2022-04-18
Payer: MEDICAID

## 2022-04-22 ENCOUNTER — SPECIALTY PHARMACY (OUTPATIENT)
Dept: PHARMACY | Facility: CLINIC | Age: 27
End: 2022-04-22
Payer: MEDICAID

## 2022-04-22 ENCOUNTER — PATIENT MESSAGE (OUTPATIENT)
Dept: PHARMACY | Facility: CLINIC | Age: 27
End: 2022-04-22
Payer: MEDICAID

## 2022-04-22 DIAGNOSIS — Z20.6 CONTACT WITH AND (SUSPECTED) EXPOSURE TO HUMAN IMMUNODEFICIENCY VIRUS (HIV): Primary | ICD-10-CM

## 2022-04-25 NOTE — TELEPHONE ENCOUNTER
Specialty Pharmacy - Refill Coordination    Specialty Medication Orders Linked to Encounter    Flowsheet Row Most Recent Value   Medication #1 emtricitabine-tenofovir alafen (DESCOVY) 200-25 mg Tab (Order#[id number], Rx#[id number])          Refill Questions - Documented Responses    Flowsheet Row Most Recent Value   Patient Availability and HIPAA Verification    Does patient want to proceed with activity? Yes   HIPAA/medical authority confirmed? Yes   Relationship to patient of person spoken to? Self   Refill Screening Questions    Changes to allergies? No   Changes to medications? No   New conditions since last clinic visit? No   Unplanned office visit, urgent care, ED, or hospital admission in the last 4 weeks? No   How does patient/caregiver feel medication is working? Good   Financial problems or insurance changes? No   How many doses of your specialty medications were missed in the last 4 weeks? 0   Would patient like to speak to a pharmacist? No   When does the patient need to receive the medication? 04/26/22   Refill Delivery Questions    How will the patient receive the medication? Pickup   When does the patient need to receive the medication? 04/26/22   Shipping Address Home   Address in [facility] confirmed and updated if neccessary? Yes   Expected Copay ($) 0   Is the patient able to afford the medication copay? Yes   Payment Method zero copay   Days supply of Refill 30   Supplies needed? No supplies needed   Refill activity completed? Yes   Refill activity plan Refill scheduled   Shipment/Pickup Date: 04/26/22          Current Outpatient Medications   Medication Sig    ACZONE 7.5 % GlwP APPLY TO THE AFFECTED AREA ON FACE EVERY DAY    emtricitabine-tenofovir alafen (DESCOVY) 200-25 mg Tab Take 1 tablet by mouth Once a day for 90 days    emtricitabine-tenofovir alafen (DESCOVY) 200-25 mg Tab 1 tablet Orally Once a day 90 days    FABIOR 0.1 % Foam APPLY TO THE AFFECTED AREA ON BACK EVERY EVENING    Last reviewed on 10/18/2021  3:22 PM by Arlette El    Review of patient's allergies indicates:  No Known Allergies Last reviewed on  10/18/2021 3:22 PM by Arlette El      Tasks added this encounter   5/19/2022 - Refill Call (Auto Added)  4/27/2022 - Pickup Reminder   Tasks due within next 3 months   No tasks due.     Rajni Mejia, SherleyD  [facility]  [address] [place] [address]  Phone: [phone]  Fax: [phone]

## 2022-04-27 ENCOUNTER — PATIENT MESSAGE (OUTPATIENT)
Dept: PHARMACY | Facility: CLINIC | Age: 27
End: 2022-04-27
Payer: MEDICAID

## 2022-05-19 ENCOUNTER — SPECIALTY PHARMACY (OUTPATIENT)
Dept: PHARMACY | Facility: CLINIC | Age: 27
End: 2022-05-19
Payer: MEDICAID

## 2022-05-19 NOTE — TELEPHONE ENCOUNTER
"Incoming call from pt inquiring if he should obtain MenACWY vaccine. It has been 2010 since his last booster and he will be traveling/living in communities in [place] where there is an outbreak over the summer. Discussed per MenACWY prescribing information since it has been >4 years since last booster and per  "CDC Meninogococcal Disease in [place], 2022" he should get vaccinated with 1 booster dose. Pt plans to obtain booster before his trip and has no further questions/concerns at this time. He deferred Descovy refill until next week - rescheduled activity.  "

## 2022-05-25 ENCOUNTER — SPECIALTY PHARMACY (OUTPATIENT)
Dept: PHARMACY | Facility: CLINIC | Age: 27
End: 2022-05-25
Payer: MEDICAID

## 2022-05-25 NOTE — TELEPHONE ENCOUNTER
Specialty Pharmacy - Refill Coordination    Specialty Medication Orders Linked to Encounter    Flowsheet Row Most Recent Value   Medication #1 emtricitabine-tenofovir alafen (DESCOVY) 200-25 mg Tab (Order#[id number], Rx#[id number])          Refill Questions - Documented Responses    Flowsheet Row Most Recent Value   Patient Availability and HIPAA Verification    Does patient want to proceed with activity? Yes   HIPAA/medical authority confirmed? Yes   Relationship to patient of person spoken to? Self   Refill Screening Questions    Changes to allergies? No   Changes to medications? No   New conditions since last clinic visit? No   Unplanned office visit, urgent care, ED, or hospital admission in the last 4 weeks? No   How does patient/caregiver feel medication is working? Good   Financial problems or insurance changes? No   How many doses of your specialty medications were missed in the last 4 weeks? 0   Would patient like to speak to a pharmacist? No   When does the patient need to receive the medication? 05/29/22   Refill Delivery Questions    How will the patient receive the medication? Pickup   When does the patient need to receive the medication? 05/29/22   Address in [facility] confirmed and updated if neccessary? Yes   Expected Copay ($) 0   Is the patient able to afford the medication copay? Yes   Payment Method zero copay   Days supply of Refill 30   Supplies needed? No supplies needed   Refill activity completed? Yes   Refill activity plan Refill scheduled   Shipment/Pickup Date: 05/26/22          Current Outpatient Medications   Medication Sig    ACZONE 7.5 % GlwP APPLY TO THE AFFECTED AREA ON FACE EVERY DAY    emtricitabine-tenofovir alafen (DESCOVY) 200-25 mg Tab Take 1 tablet by mouth Once a day for 90 days    emtricitabine-tenofovir alafen (DESCOVY) 200-25 mg Tab 1 tablet Orally Once a day 90 days    FABIOR 0.1 % Foam APPLY TO THE AFFECTED AREA ON BACK EVERY EVENING   Last reviewed on  10/18/2021  3:22 PM by Arlette El    Review of patient's allergies indicates:  No Known Allergies Last reviewed on  10/18/2021 3:22 PM by Arlette El      Tasks added this encounter   6/21/2022 - Refill Call (Auto Added)  5/27/2022 - Pickup Reminder   Tasks due within next 3 months   No tasks due.     Joe Stoner, PharmD  [facility]  [address] [place] [address]  Phone: [phone]  Fax: [phone]

## 2022-06-21 ENCOUNTER — SPECIALTY PHARMACY (OUTPATIENT)
Dept: PHARMACY | Facility: CLINIC | Age: 27
End: 2022-06-21
Payer: MEDICAID

## 2022-06-21 NOTE — TELEPHONE ENCOUNTER
Specialty Pharmacy - Refill Coordination    Specialty Medication Orders Linked to Encounter    Flowsheet Row Most Recent Value   Medication #1 emtricitabine-tenofovir alafen (DESCOVY) 200-25 mg Tab (Order#[id number], Rx#[id number])          Refill Questions - Documented Responses    Flowsheet Row Most Recent Value   Patient Availability and HIPAA Verification    Does patient want to proceed with activity? Yes   HIPAA/medical authority confirmed? Yes   Relationship to patient of person spoken to? Self   Refill Screening Questions    Changes to allergies? No   Changes to medications? No   New conditions since last clinic visit? No   Unplanned office visit, urgent care, ED, or hospital admission in the last 4 weeks? No   How does patient/caregiver feel medication is working? Good   Financial problems or insurance changes? No   How many doses of your specialty medications were missed in the last 4 weeks? 0   Would patient like to speak to a pharmacist? No   When does the patient need to receive the medication? 06/26/22   Refill Delivery Questions    How will the patient receive the medication? Pickup   When does the patient need to receive the medication? 06/26/22   Expected Copay ($) 0   Is the patient able to afford the medication copay? Yes   Payment Method zero copay   Days supply of Refill 30   Supplies needed? No supplies needed   Refill activity completed? Yes   Refill activity plan Refill scheduled   Shipment/Pickup Date: 06/22/22          Current Outpatient Medications   Medication Sig    ACZONE 7.5 % GlwP APPLY TO THE AFFECTED AREA ON FACE EVERY DAY    emtricitabine-tenofovir alafen (DESCOVY) 200-25 mg Tab Take 1 tablet by mouth Once a day for 90 days    emtricitabine-tenofovir alafen (DESCOVY) 200-25 mg Tab 1 tablet Orally Once a day 90 days    emtricitabine-tenofovir alafen (DESCOVY) 200-25 mg Tab Take 1 tablet by mouth once daily.    FABIOR 0.1 % Foam APPLY TO THE AFFECTED AREA ON BACK EVERY EVENING    Last reviewed on 10/18/2021  3:22 PM by Arlette El    Review of patient's allergies indicates:  No Known Allergies Last reviewed on  10/18/2021 3:22 PM by Arlette El      Tasks added this encounter   7/19/2022 - Refill Call (Auto Added)  6/23/2022 - Pickup Reminder   Tasks due within next 3 months   No tasks due.     Avril Li, PharmD  [facility]  [address] [place] [address]  Phone: [phone]  Fax: [phone]

## 2022-07-08 PROCEDURE — 99284 EMERGENCY DEPT VISIT MOD MDM: CPT | Mod: ,,, | Performed by: EMERGENCY MEDICINE

## 2022-07-08 PROCEDURE — 99284 EMERGENCY DEPT VISIT MOD MDM: CPT | Mod: 25

## 2022-07-08 PROCEDURE — 96361 HYDRATE IV INFUSION ADD-ON: CPT

## 2022-07-08 PROCEDURE — 96374 THER/PROPH/DIAG INJ IV PUSH: CPT

## 2022-07-08 PROCEDURE — 99284 PR EMERGENCY DEPT VISIT,LEVEL IV: ICD-10-PCS | Mod: ,,, | Performed by: EMERGENCY MEDICINE

## 2022-07-09 ENCOUNTER — HOSPITAL ENCOUNTER (EMERGENCY)
Facility: HOSPITAL | Age: 27
Discharge: HOME | End: 2022-07-09
Attending: EMERGENCY MEDICINE
Payer: MEDICAID

## 2022-07-09 VITALS
WEIGHT: 156 LBS | SYSTOLIC BLOOD PRESSURE: 124 MMHG | RESPIRATION RATE: 16 BRPM | HEART RATE: 100 BPM | HEIGHT: 71 IN | BODY MASS INDEX: 21.84 KG/M2 | OXYGEN SATURATION: 96 % | DIASTOLIC BLOOD PRESSURE: 76 MMHG | TEMPERATURE: 99 F

## 2022-07-09 DIAGNOSIS — J03.90 TONSILLITIS: Primary | ICD-10-CM

## 2022-07-09 DIAGNOSIS — R00.0 TACHYCARDIA: ICD-10-CM

## 2022-07-09 LAB
ALBUMIN SERPL BCP-MCNC: 4.3 G/DL (ref 3.5–5.2)
ALP SERPL-CCNC: 84 U/L (ref 55–135)
ALT SERPL W/O P-5'-P-CCNC: 31 U/L (ref 10–44)
ANION GAP SERPL CALC-SCNC: 13 MMOL/L (ref 8–16)
AST SERPL-CCNC: 29 U/L (ref 10–40)
BASOPHILS # BLD AUTO: 0.03 K/UL (ref 0–0.2)
BASOPHILS NFR BLD: 0.4 % (ref 0–1.9)
BILIRUB SERPL-MCNC: 0.7 MG/DL (ref 0.1–1)
BUN SERPL-MCNC: 6 MG/DL (ref 6–20)
CALCIUM SERPL-MCNC: 9.7 MG/DL (ref 8.7–10.5)
CHLORIDE SERPL-SCNC: 103 MMOL/L (ref 95–110)
CO2 SERPL-SCNC: 24 MMOL/L (ref 23–29)
CREAT SERPL-MCNC: 0.8 MG/DL (ref 0.5–1.4)
DIFFERENTIAL METHOD: NORMAL
EOSINOPHIL # BLD AUTO: 0.2 K/UL (ref 0–0.5)
EOSINOPHIL NFR BLD: 3 % (ref 0–8)
ERYTHROCYTE [DISTWIDTH] IN BLOOD BY AUTOMATED COUNT: 12.3 % (ref 11.5–14.5)
EST. GFR  (AFRICAN AMERICAN): >60 ML/MIN/1.73 M^2
EST. GFR  (NON AFRICAN AMERICAN): >60 ML/MIN/1.73 M^2
GLUCOSE SERPL-MCNC: 92 MG/DL (ref 70–110)
HCT VFR BLD AUTO: 46.5 % (ref 40–54)
HGB BLD-MCNC: 16.1 G/DL (ref 14–18)
IMM GRANULOCYTES # BLD AUTO: 0.02 K/UL (ref 0–0.04)
IMM GRANULOCYTES NFR BLD AUTO: 0.3 % (ref 0–0.5)
LYMPHOCYTES # BLD AUTO: 1.3 K/UL (ref 1–4.8)
LYMPHOCYTES NFR BLD: 19.2 % (ref 18–48)
MCH RBC QN AUTO: 30.6 PG (ref 27–31)
MCHC RBC AUTO-ENTMCNC: 34.6 G/DL (ref 32–36)
MCV RBC AUTO: 88 FL (ref 82–98)
MONOCYTES # BLD AUTO: 0.6 K/UL (ref 0.3–1)
MONOCYTES NFR BLD: 8.2 % (ref 4–15)
NEUTROPHILS # BLD AUTO: 4.6 K/UL (ref 1.8–7.7)
NEUTROPHILS NFR BLD: 68.9 % (ref 38–73)
NRBC BLD-RTO: 0 /100 WBC
PLATELET # BLD AUTO: 226 K/UL (ref 150–450)
PLATELET BLD QL SMEAR: NORMAL
PMV BLD AUTO: 9.6 FL (ref 9.2–12.9)
POTASSIUM SERPL-SCNC: 3.6 MMOL/L (ref 3.5–5.1)
PROT SERPL-MCNC: 8.3 G/DL (ref 6–8.4)
RBC # BLD AUTO: 5.26 M/UL (ref 4.6–6.2)
SODIUM SERPL-SCNC: 140 MMOL/L (ref 136–145)
WBC # BLD AUTO: 6.67 K/UL (ref 3.9–12.7)

## 2022-07-09 PROCEDURE — 85025 COMPLETE CBC W/AUTO DIFF WBC: CPT | Performed by: EMERGENCY MEDICINE

## 2022-07-09 PROCEDURE — 93005 ELECTROCARDIOGRAM TRACING: CPT

## 2022-07-09 PROCEDURE — 93010 ELECTROCARDIOGRAM REPORT: CPT | Mod: ,,, | Performed by: INTERNAL MEDICINE

## 2022-07-09 PROCEDURE — 80053 COMPREHEN METABOLIC PANEL: CPT | Performed by: EMERGENCY MEDICINE

## 2022-07-09 PROCEDURE — 63600175 PHARM REV CODE 636 W HCPCS: Performed by: EMERGENCY MEDICINE

## 2022-07-09 PROCEDURE — 25000003 PHARM REV CODE 250: Performed by: EMERGENCY MEDICINE

## 2022-07-09 PROCEDURE — 93010 EKG 12-LEAD: ICD-10-PCS | Mod: ,,, | Performed by: INTERNAL MEDICINE

## 2022-07-09 RX ORDER — HYDROCODONE BITARTRATE AND ACETAMINOPHEN 5; 325 MG/1; MG/1
1 TABLET ORAL EVERY 4 HOURS PRN
Qty: 9 TABLET | Refills: 0 | Status: SHIPPED | OUTPATIENT
Start: 2022-07-09 | End: 2022-07-12

## 2022-07-09 RX ORDER — DEXAMETHASONE SODIUM PHOSPHATE 4 MG/ML
INJECTION, SOLUTION INTRA-ARTICULAR; INTRALESIONAL; INTRAMUSCULAR; INTRAVENOUS; SOFT TISSUE
Status: DISCONTINUED
Start: 2022-07-09 | End: 2022-07-09 | Stop reason: HOSPADM

## 2022-07-09 RX ORDER — KETOROLAC TROMETHAMINE 30 MG/ML
10 INJECTION, SOLUTION INTRAMUSCULAR; INTRAVENOUS
Status: COMPLETED | OUTPATIENT
Start: 2022-07-09 | End: 2022-07-09

## 2022-07-09 RX ORDER — HYDROCODONE BITARTRATE AND ACETAMINOPHEN 5; 325 MG/1; MG/1
TABLET ORAL
Status: DISCONTINUED
Start: 2022-07-09 | End: 2022-07-09 | Stop reason: HOSPADM

## 2022-07-09 RX ORDER — ONDANSETRON 4 MG/1
8 TABLET, ORALLY DISINTEGRATING ORAL EVERY 8 HOURS PRN
Qty: 20 TABLET | Refills: 0 | Status: SHIPPED | OUTPATIENT
Start: 2022-07-09

## 2022-07-09 RX ADMIN — SODIUM CHLORIDE 1000 ML: 0.9 INJECTION, SOLUTION INTRAVENOUS at 02:07

## 2022-07-09 RX ADMIN — KETOROLAC TROMETHAMINE 10 MG: 30 INJECTION, SOLUTION INTRAMUSCULAR at 02:07

## 2022-07-09 NOTE — DISCHARGE INSTRUCTIONS
Please take medications as discussed.  Tylenol Motrin as needed for pain, Norco for breakthrough pain.  Please continue to monitor symptoms.  Your antibiotic shot should be enough to cover this infection.  Give any worsening or concerning symptoms please return to ER or PCP for re-evaluation.

## 2022-07-09 NOTE — ED PROVIDER NOTES
Encounter Date: 7/8/2022       History     Chief Complaint   Patient presents with    Sore Throat     States extreme pain in throat and hasn't eaten since Thursday. States difficulty swallowing and breathing. Pt able to swallow secretions in triage, throat red and swollen with pus noted to tonsils. Strep test yesterday was -, received antibiotic shot yesterday       Pleasant 27-year-old male presents the ER for evaluation of persistent sore throat.  Onset about 5 days.  Saw PCP yesterday, COVID strep swabs were negative, given what sounds like penicillin injection.  Patient reports fevers have improved with sore throat still there.  Complaining of pain with eating, no real voice change.  He came to the ER for pain control.        Review of patient's allergies indicates:  No Known Allergies  No past medical history on file.  No past surgical history on file.  No family history on file.     Review of Systems   Constitutional: Positive for fatigue.   HENT: Positive for sore throat.    All other systems reviewed and are negative.      Physical Exam     Initial Vitals [07/08/22 2355]   BP Pulse Resp Temp SpO2   (!) 161/88 (!) 120 18 98.5 °F (36.9 °C) 100 %      MAP       --         Physical Exam    Constitutional: He appears well-developed and well-nourished.   HENT:   Head: Normocephalic and atraumatic.   Mouth/Throat: Oropharyngeal exudate present.    left-sided tonsillar enlargement with exudate noted no airway compromise   Neck:   Normal range of motion.  Pulmonary/Chest: Breath sounds normal. No respiratory distress.   Musculoskeletal:         General: Normal range of motion.      Cervical back: Normal range of motion.     Lymphadenopathy:     He has cervical adenopathy.   Neurological: He is alert and oriented to person, place, and time. He has normal strength. GCS score is 15. GCS eye subscore is 4. GCS verbal subscore is 5. GCS motor subscore is 6.   Skin: Skin is warm and dry. Capillary refill takes less than 2  seconds.   Psychiatric: He has a normal mood and affect. Thought content normal.         ED Course   Procedures  Labs Reviewed   COMPREHENSIVE METABOLIC PANEL   CBC W/ AUTO DIFFERENTIAL        ECG Results          EKG 12-lead (Final result)  Result time 07/09/22 07:28:00    Final result by Interface, Lab In [facility] (07/09/22 07:28:00)                 Narrative:    Test Reason : R00.0,    Vent. Rate : 109 BPM     Atrial Rate : 109 BPM     P-R Int : 144 ms          QRS Dur : 076 ms      QT Int : 314 ms       P-R-T Axes : 079 100 056 degrees     QTc Int : 422 ms    Sinus tachycardia  Rightward axis  Borderline Abnormal ECG  No previous ECGs available  Confirmed by KAMALA GONZALEZ MD ([id number]) on 7/9/2022 7:27:46 AM    Referred By: ANGELICA   SELF           Confirmed By:KAMALA GONZALEZ MD                            Imaging Results    None          Medications   sodium chloride 0.9% bolus 1,000 mL (0 mLs Intravenous Stopped 7/9/22 0315)   ketorolac injection 9.999 mg (9.999 mg Intravenous Given During Downtime 7/9/22 0215)     Medical Decision Making:   Initial Assessment:       27-year-old male presents the ER for evaluation of persistent sore throat.  Has been given antibiotics by PCP.  Swabs were negative.  Will plan blood work symptomatic support reassess hopefully discharge.               ED Course as of 07/10/22 0003   Sat Jul 09, 2022   0612 Resting comfortably bed no acute distress patient feeling much better.  Labs reviewed no acute process identified.  Discussed with patient plan to discharge home, he took penicillin yesterday, will continue to observe for any worsening symptoms.  Will give Zofran and Norco and work note, return precautions discussed patient will be discharged. [SE]      ED Course User Index  [SE] Deborah Buchanan MD             Clinical Impression:   Final diagnoses:  [R00.0] Tachycardia  [J03.90] Tonsillitis (Primary)          ED Disposition Condition    Discharge Stable        ED  Prescriptions     Medication Sig Dispense Start Date End Date Auth. Provider    HYDROcodone-acetaminophen (NORCO) 5-325 mg per tablet Take 1 tablet by mouth every 4 (four) hours as needed for Pain. 9 tablet 7/9/2022 7/12/2022 Deborah Buchanan MD    ondansetron (ZOFRAN-ODT) 4 MG TbDL Take 2 tablets (8 mg total) by mouth every 8 (eight) hours as needed (n/v). 20 tablet 7/9/2022  Deborah Buchanan MD        Follow-up Information     Follow up With Specialties Details Why Contact Info    [facility] Schedule an appointment as soon as possible for a visit  As needed [address] [facility] [address]  [phone]           Deborah Buchanan MD  07/10/22 0003

## 2022-07-09 NOTE — Clinical Note
"Alcides Hercules" Gwyn was seen and treated in our emergency department on 7/8/2022.  He may return to work on 07/12/2022.       If you have any questions or concerns, please don't hesitate to call.      Deborah Buchanan MD"

## 2022-07-22 ENCOUNTER — SPECIALTY PHARMACY (OUTPATIENT)
Dept: PHARMACY | Facility: CLINIC | Age: 27
End: 2022-07-22
Payer: MEDICAID

## 2022-07-22 DIAGNOSIS — Z20.6 CONTACT WITH AND (SUSPECTED) EXPOSURE TO HUMAN IMMUNODEFICIENCY VIRUS (HIV): Primary | ICD-10-CM

## 2022-07-22 NOTE — TELEPHONE ENCOUNTER
Specialty Pharmacy - Refill Coordination    Specialty Medication Orders Linked to Encounter    Flowsheet Row Most Recent Value   Medication #1 emtricitabine-tenofovir alafen (DESCOVY) 200-25 mg Tab (Order#[id number], Rx#[id number])          Refill Questions - Documented Responses    Flowsheet Row Most Recent Value   Patient Availability and HIPAA Verification    Does patient want to proceed with activity? Yes   HIPAA/medical authority confirmed? Yes   Relationship to patient of person spoken to? Self   Refill Screening Questions    Changes to allergies? No   Changes to medications? No   New conditions since last clinic visit? No   Unplanned office visit, urgent care, ED, or hospital admission in the last 4 weeks? No   How does patient/caregiver feel medication is working? Good   Financial problems or insurance changes? No   How many doses of your specialty medications were missed in the last 4 weeks? 0   Would patient like to speak to a pharmacist? No   When does the patient need to receive the medication? 07/27/22   Refill Delivery Questions    How will the patient receive the medication? Delivery Carmita   When does the patient need to receive the medication? 07/27/22   Shipping Address Home   Address in [facility] confirmed and updated if neccessary? Yes   Expected Copay ($) 0   Is the patient able to afford the medication copay? Yes   Payment Method zero copay   Days supply of Refill 30   Supplies needed? No supplies needed   Refill activity completed? Yes   Refill activity plan Refill scheduled   Shipment/Pickup Date: 07/22/22          Current Outpatient Medications   Medication Sig    ACZONE 7.5 % GlwP APPLY TO THE AFFECTED AREA ON FACE EVERY DAY    emtricitabine-tenofovir alafen (DESCOVY) 200-25 mg Tab Take 1 tablet by mouth Once a day for 90 days    emtricitabine-tenofovir alafen (DESCOVY) 200-25 mg Tab 1 tablet Orally Once a day 90 days    emtricitabine-tenofovir alafen (DESCOVY) 200-25 mg Tab Take  1 tablet by mouth once daily.    FABIOR 0.1 % Foam APPLY TO THE AFFECTED AREA ON BACK EVERY EVENING    ondansetron (ZOFRAN-ODT) 4 MG TbDL Take 2 tablets (8 mg total) by mouth every 8 (eight) hours as needed (n/v).   Last reviewed on 10/18/2021  3:22 PM by Arlette El    Review of patient's allergies indicates:  No Known Allergies Last reviewed on  7/8/2022 11:57 PM by Kristina Reaves      Tasks added this encounter   8/19/2022 - Refill Call (Auto Added)   Tasks due within next 3 months   No tasks due.     Rajni Mejia, PharmD  [facility]  [address] [facility] [address]  Phone: [phone]  Fax: [phone]

## 2022-08-19 ENCOUNTER — PATIENT MESSAGE (OUTPATIENT)
Dept: PHARMACY | Facility: CLINIC | Age: 27
End: 2022-08-19
Payer: MEDICAID

## 2022-08-22 ENCOUNTER — SPECIALTY PHARMACY (OUTPATIENT)
Dept: PHARMACY | Facility: CLINIC | Age: 27
End: 2022-08-22
Payer: MEDICAID

## 2022-08-22 NOTE — TELEPHONE ENCOUNTER
Specialty Pharmacy - Refill Coordination    Specialty Medication Orders Linked to Encounter    Flowsheet Row Most Recent Value   Medication #1 emtricitabine-tenofovir alafen (DESCOVY) 200-25 mg Tab (Order#[id number], Rx#[id number])          Refill Questions - Documented Responses    Flowsheet Row Most Recent Value   Patient Availability and HIPAA Verification    Does patient want to proceed with activity? Yes   HIPAA/medical authority confirmed? Yes   Relationship to patient of person spoken to? Self   Refill Screening Questions    Changes to allergies? No   Changes to medications? No   New conditions since last clinic visit? No   Unplanned office visit, urgent care, ED, or hospital admission in the last 4 weeks? No   How does patient/caregiver feel medication is working? Very good   Financial problems or insurance changes? No   How many doses of your specialty medications were missed in the last 4 weeks? 0   Would patient like to speak to a pharmacist? No   When does the patient need to receive the medication? 08/29/22   Refill Delivery Questions    How will the patient receive the medication? Pickup   When does the patient need to receive the medication? 08/29/22   Address in [facility] confirmed and updated if neccessary? Yes   Expected Copay ($) 0   Is the patient able to afford the medication copay? Yes   Payment Method zero copay   Days supply of Refill 30   Supplies needed? No supplies needed   Refill activity completed? Yes   Refill activity plan Refill scheduled   Shipment/Pickup Date: 08/23/22          Current Outpatient Medications   Medication Sig    ACZONE 7.5 % GlwP APPLY TO THE AFFECTED AREA ON FACE EVERY DAY    emtricitabine-tenofovir alafen (DESCOVY) 200-25 mg Tab Take 1 tablet by mouth once daily.    FABIOR 0.1 % Foam APPLY TO THE AFFECTED AREA ON BACK EVERY EVENING    ondansetron (ZOFRAN-ODT) 4 MG TbDL Take 2 tablets (8 mg total) by mouth every 8 (eight) hours as needed (n/v).   Last  reviewed on 10/18/2021  3:22 PM by Arlette El    Review of patient's allergies indicates:  No Known Allergies Last reviewed on  7/8/2022 11:57 PM by Kristina Reaves      Tasks added this encounter   9/21/2022 - Refill Call (Auto Added)  8/24/2022 - Pickup Reminder   Tasks due within next 3 months   No tasks due.     Marck Alex, PharmD  [facility]  [address] [place] [address]  Phone: [phone]  Fax: [phone]

## 2022-08-24 ENCOUNTER — PATIENT MESSAGE (OUTPATIENT)
Dept: PHARMACY | Facility: CLINIC | Age: 27
End: 2022-08-24
Payer: MEDICAID

## 2022-09-20 ENCOUNTER — SPECIALTY PHARMACY (OUTPATIENT)
Dept: PHARMACY | Facility: CLINIC | Age: 27
End: 2022-09-20
Payer: MEDICAID

## 2022-09-20 NOTE — TELEPHONE ENCOUNTER
Specialty Pharmacy - Refill Coordination    Specialty Medication Orders Linked to Encounter      Flowsheet Row Most Recent Value   Medication #1 emtricitabine-tenofovir alafen (DESCOVY) 200-25 mg Tab (Order#[id number], Rx#[id number])            Refill Questions - Documented Responses      Flowsheet Row Most Recent Value   Patient Availability and HIPAA Verification    Does patient want to proceed with activity? Yes   HIPAA/medical authority confirmed? Yes   Relationship to patient of person spoken to? Self   Refill Screening Questions    Changes to allergies? No   Changes to medications? No   New conditions since last clinic visit? No   Unplanned office visit, urgent care, ED, or hospital admission in the last 4 weeks? No   How does patient/caregiver feel medication is working? Good   Financial problems or insurance changes? No   How many doses of your specialty medications were missed in the last 4 weeks? 0   Would patient like to speak to a pharmacist? No   When does the patient need to receive the medication? 09/23/22   Refill Delivery Questions    How will the patient receive the medication? Pickup   When does the patient need to receive the medication? 09/23/22   Expected Copay ($) 0   Is the patient able to afford the medication copay? Yes   Payment Method zero copay   Days supply of Refill 30   Supplies needed? No supplies needed   Refill activity completed? Yes   Refill activity plan Refill scheduled   Shipment/Pickup Date: 09/21/22            Current Outpatient Medications   Medication Sig    ACZONE 7.5 % GlwP APPLY TO THE AFFECTED AREA ON FACE EVERY DAY    emtricitabine-tenofovir alafen (DESCOVY) 200-25 mg Tab Take 1 tablet by mouth once daily.    FABIOR 0.1 % Foam APPLY TO THE AFFECTED AREA ON BACK EVERY EVENING    ondansetron (ZOFRAN-ODT) 4 MG TbDL Take 2 tablets (8 mg total) by mouth every 8 (eight) hours as needed (n/v).   Last reviewed on 10/18/2021  3:22 PM by Arlette El    Review of  patient's allergies indicates:  No Known Allergies Last reviewed on  7/8/2022 11:57 PM by Kristina Reaves      Tasks added this encounter   10/20/2022 - Refill Call (Auto Added)   Tasks due within next 3 months   No tasks due.     Ema Jones, PharmD  [facility]  [address] [place], [place] [address]  [facility] [address]  Phone: [phone]  Fax: [phone]

## 2022-10-19 ENCOUNTER — SPECIALTY PHARMACY (OUTPATIENT)
Dept: PHARMACY | Facility: CLINIC | Age: 27
End: 2022-10-19
Payer: MEDICAID

## 2022-10-19 NOTE — TELEPHONE ENCOUNTER
Specialty Pharmacy - Refill Coordination    Specialty Medication Orders Linked to Encounter      Flowsheet Row Most Recent Value   Medication #1 emtricitabine-tenofovir alafen (DESCOVY) 200-25 mg Tab (Order#[id number], Rx#[id number])            Refill Questions - Documented Responses      Flowsheet Row Most Recent Value   Patient Availability and HIPAA Verification    Does patient want to proceed with activity? Yes   HIPAA/medical authority confirmed? Yes   Relationship to patient of person spoken to? Self   Refill Screening Questions    Changes to allergies? No   Changes to medications? No   New conditions since last clinic visit? No   Unplanned office visit, urgent care, ED, or hospital admission in the last 4 weeks? No   How does patient/caregiver feel medication is working? Good   Financial problems or insurance changes? No   How many doses of your specialty medications were missed in the last 4 weeks? 0   Would patient like to speak to a pharmacist? No   When does the patient need to receive the medication? 10/22/22   Refill Delivery Questions    How will the patient receive the medication? Pickup   When does the patient need to receive the medication? 10/22/22   Shipping Address Home   Address in [facility] confirmed and updated if neccessary? Yes   Expected Copay ($) 0   Is the patient able to afford the medication copay? Yes   Payment Method zero copay   Days supply of Refill 30   Supplies needed? No supplies needed   Refill activity completed? Yes   Refill activity plan Refill scheduled   Shipment/Pickup Date: 10/20/22            Current Outpatient Medications   Medication Sig    ACZONE 7.5 % GlwP APPLY TO THE AFFECTED AREA ON FACE EVERY DAY    emtricitabine-tenofovir alafen (DESCOVY) 200-25 mg Tab Take 1 tablet by mouth once daily.    FABIOR 0.1 % Foam APPLY TO THE AFFECTED AREA ON BACK EVERY EVENING    ondansetron (ZOFRAN-ODT) 4 MG TbDL Take 2 tablets (8 mg total) by mouth every 8 (eight) hours as  needed (n/v).   Last reviewed on 10/18/2021  3:22 PM by Arlette El    Review of patient's allergies indicates:  No Known Allergies Last reviewed on  7/8/2022 11:57 PM by Kristina Reaves      Tasks added this encounter   11/14/2022 - Refill Call (Auto Added)  10/21/2022 - Pickup Reminder   Tasks due within next 3 months   No tasks due.     Edith Head, PharmD  [facility]  [address] [place] [address]  Phone: [phone]  Fax: [phone]

## 2022-11-01 ENCOUNTER — HOSPITAL ENCOUNTER (EMERGENCY)
Facility: HOSPITAL | Age: 27
Discharge: HOME | End: 2022-11-01
Attending: EMERGENCY MEDICINE
Payer: MEDICAID

## 2022-11-01 VITALS
SYSTOLIC BLOOD PRESSURE: 125 MMHG | OXYGEN SATURATION: 100 % | HEART RATE: 98 BPM | TEMPERATURE: 98 F | RESPIRATION RATE: 20 BRPM | DIASTOLIC BLOOD PRESSURE: 87 MMHG

## 2022-11-01 DIAGNOSIS — J34.89 NOSE PAIN: ICD-10-CM

## 2022-11-01 DIAGNOSIS — S00.33XA CONTUSION OF NOSE, INITIAL ENCOUNTER: Primary | ICD-10-CM

## 2022-11-01 PROCEDURE — 99283 EMERGENCY DEPT VISIT LOW MDM: CPT

## 2022-11-01 RX ORDER — IBUPROFEN 600 MG/1
600 TABLET ORAL EVERY 6 HOURS PRN
Qty: 20 TABLET | Refills: 0 | Status: SHIPPED | OUTPATIENT
Start: 2022-11-01
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
## 2022-11-01 NOTE — DISCHARGE INSTRUCTIONS

## 2022-11-18 ENCOUNTER — SPECIALTY PHARMACY (OUTPATIENT)
Dept: PHARMACY | Facility: CLINIC | Age: 27
End: 2022-11-18
Payer: MEDICAID
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
## 2022-11-22 ENCOUNTER — PATIENT MESSAGE (OUTPATIENT)
Dept: PHARMACY | Facility: CLINIC | Age: 27
End: 2022-11-22
Payer: MEDICAID

## 2022-12-14 ENCOUNTER — PATIENT MESSAGE (OUTPATIENT)
Dept: PHARMACY | Facility: CLINIC | Age: 27
End: 2022-12-14
Payer: MEDICAID

## 2022-12-16 ENCOUNTER — SPECIALTY PHARMACY (OUTPATIENT)
Dept: PHARMACY | Facility: CLINIC | Age: 27
End: 2022-12-16
Payer: MEDICAID

## 2022-12-16 NOTE — TELEPHONE ENCOUNTER
Incoming call regarding Descovy. Pt stated he would like to  on 12/20/22. Pt stated that he went to Urgent care for infection and was given Bactrim DS, Azithromax, and Bactroban Ointment. Transferred to [place].

## 2022-12-16 NOTE — TELEPHONE ENCOUNTER
Specialty Pharmacy - Refill Coordination    Specialty Medication Orders Linked to Encounter      Flowsheet Row Most Recent Value   Medication #1 emtricitabine-tenofovir alafen (DESCOVY) 200-25 mg Tab (Order#[id number], Rx#[id number])            Refill Questions - Documented Responses      Flowsheet Row Most Recent Value   Patient Availability and HIPAA Verification    Does patient want to proceed with activity? Yes   HIPAA/medical authority confirmed? Yes   Relationship to patient of person spoken to? Self   Refill Screening Questions    Changes to allergies? No   Changes to medications? Yes  [Bactrim DS and Azithromycin.  No DDI noted and informed patient of this.]   New conditions since last clinic visit? No   Unplanned office visit, urgent care, ED, or hospital admission in the last 4 weeks? Yes   How does patient/caregiver feel medication is working? Good   Financial problems or insurance changes? No   How many doses of your specialty medications were missed in the last 4 weeks? 0   Would patient like to speak to a pharmacist? No   When does the patient need to receive the medication? 12/20/22   Refill Delivery Questions    How will the patient receive the medication? Pickup   When does the patient need to receive the medication? 12/20/22   Expected Copay ($) 0   Is the patient able to afford the medication copay? Yes   Payment Method zero copay   Days supply of Refill 30   Supplies needed? No supplies needed   Refill activity completed? Yes   Refill activity plan Refill scheduled   Shipment/Pickup Date: 12/20/22            Current Outpatient Medications   Medication Sig    ACZONE 7.5 % GlwP APPLY TO THE AFFECTED AREA ON FACE EVERY DAY    emtricitabine-tenofovir alafen (DESCOVY) 200-25 mg Tab Take 1 tablet by mouth once daily.    FABIOR 0.1 % Foam APPLY TO THE AFFECTED AREA ON BACK EVERY EVENING    ibuprofen (ADVIL,MOTRIN) 600 MG tablet Take 1 tablet (600 mg total) by mouth every 6 (six) hours as needed for  Pain.    ondansetron (ZOFRAN-ODT) 4 MG TbDL Take 2 tablets (8 mg total) by mouth every 8 (eight) hours as needed (n/v).   Last reviewed on 10/18/2021  3:22 PM by Arlette El    Review of patient's allergies indicates:  No Known Allergies Last reviewed on  11/1/2022 12:27 PM by Blanquita Iyer      Tasks added this encounter   1/12/2023 - Refill Call (Auto Added)  12/21/2022 - Pickup Reminder   Tasks due within next 3 months   No tasks due.     Antony Duggan, PharmD  [facility]  [address] [facility] [address]  Phone: [phone]  Fax: [phone]

## 2022-12-28 ENCOUNTER — HOSPITAL ENCOUNTER (EMERGENCY)
Facility: OTHER | Age: 27
Discharge: HOME | End: 2022-12-28
Attending: EMERGENCY MEDICINE
Payer: MEDICAID

## 2022-12-28 VITALS
HEART RATE: 95 BPM | RESPIRATION RATE: 18 BRPM | HEIGHT: 71 IN | SYSTOLIC BLOOD PRESSURE: 129 MMHG | TEMPERATURE: 98 F | OXYGEN SATURATION: 100 % | BODY MASS INDEX: 22.4 KG/M2 | DIASTOLIC BLOOD PRESSURE: 83 MMHG | WEIGHT: 160 LBS

## 2022-12-28 DIAGNOSIS — B85.3 PUBIC LICE: ICD-10-CM

## 2022-12-28 DIAGNOSIS — R21 RASH AND NONSPECIFIC SKIN ERUPTION: Primary | ICD-10-CM

## 2022-12-28 PROCEDURE — 99283 EMERGENCY DEPT VISIT LOW MDM: CPT

## 2022-12-28 RX ORDER — PERMETHRIN 50 MG/G
CREAM TOPICAL
Qty: 60 G | Refills: 0 | Status: SHIPPED | OUTPATIENT
Start: 2022-12-28

## 2022-12-28 NOTE — ED PROVIDER NOTES
"     Source of History:  The patient    Chief complaint:  Rash (Pt presented to ER with c/o itchy rash to body. Patient stated he saw lice in hair on head and body hair.)      HPI:  Alcides Pascal Jr. is a 27 y.o. male presenting with itchy like rash that he noticed over the last couple of days.  Mainly in the groin region sometimes over his legs.  States he saw a little that is and thinks it is pubic lice.  Denies any fevers or chills or discharge.  Denies any rash or itchiness of the head.    This is the extent to the patients complaints today here in the emergency department.    ROS: As per HPI and below:  Constitutional: No fever.  No chills.  Eyes: No visual changes.   ENT: No sore throat. No ear pain.  Urinary: No abnormal urination.  MSK: No back pain. No joint pain.   Integument:  Positive for rash    Review of patient's allergies indicates:  No Known Allergies    PMH:  As per HPI and below:  Past Medical History:   Diagnosis Date    ADHD (attention deficit hyperactivity disorder)      No past surgical history on file.    Social History     Tobacco Use    Smoking status: Former    Smokeless tobacco: Never   Substance Use Topics    Alcohol use: Yes    Drug use: Never       Physical Exam:    /83 (BP Location: Left arm, Patient Position: Sitting)   Pulse 95   Temp 98.4 °F (36.9 °C) (Oral)   Resp 18   Ht 5' 11" (1.803 m)   Wt 72.6 kg (160 lb)   SpO2 100%   BMI 22.32 kg/m²   Nursing note and vital signs reviewed.  Constitutional: No acute distress.  Nontoxic  Eyes: No conjunctival injection.  Extraocular muscles are intact.  ENT: Normal phonation.  Musculoskeletal: Good range of motion all joints.  No deformities.  Neck supple.  No meningismus. Neurovascularly intact.  Skin:  Some excoriations in the pubic hair.  No obvious findings of Lice  no erythema induration or fluctuance.  Psych: Appropriate, conversant.    Labs that have been ordered have been independently reviewed and interpreted by " myself.        I decided to obtain the patient's medical records.  Summary of Medical Records:      MDM/ Differential Dx:  27 y.o. male with suspected pubic lice.  Will treat with permethrin.  No evidence of cellulitis abscess or any other abnormalities at this time.      No further workup is indicated in the emergency department today.  I updated pt regarding results and I counseled pt regarding supportive care measures.  Diagnosis and treatment plan explained to patient. I have answered all questions and the patient is satisfied with the plan of care. Patient discharged home in stable condition.                  Diagnostic Impression:    1. Rash and nonspecific skin eruption    2. Pubic lice         ED Disposition Condition    Discharge Stable            ED Prescriptions       Medication Sig Dispense Start Date End Date Auth. Provider    permethrin (ELIMITE) 5 % cream Apply to affected area once. Use as directed 60 g 12/28/2022 -- Rudy Méndez, DO          Follow-up Information       Follow up With Specialties Details Why Contact Info    Nikhil Rodgers MD Internal Medicine In 2 weeks If symptoms worsen [address] [facility] [address]  [phone]               Rudy Méndez, DO  12/28/22 1459

## 2022-12-29 ENCOUNTER — TELEPHONE (OUTPATIENT)
Dept: PHARMACY | Facility: CLINIC | Age: 27
End: 2022-12-29
Payer: MEDICAID

## 2022-12-29 NOTE — TELEPHONE ENCOUNTER
Incoming call from pt.  Pt was prescribed permethrin cream by ER and had questions regarding application.  Informed pt to apply at bedtime and rinse off in the morning and can be reapplied in 7 days if needed.  Pt voiced understanding.

## 2023-01-12 ENCOUNTER — PATIENT MESSAGE (OUTPATIENT)
Dept: PHARMACY | Facility: CLINIC | Age: 28
End: 2023-01-12
Payer: MEDICAID

## 2023-01-17 ENCOUNTER — SPECIALTY PHARMACY (OUTPATIENT)
Dept: PHARMACY | Facility: CLINIC | Age: 28
End: 2023-01-17
Payer: MEDICAID

## 2023-01-17 NOTE — TELEPHONE ENCOUNTER
Specialty Pharmacy - Refill Coordination    Specialty Medication Orders Linked to Encounter      Flowsheet Row Most Recent Value   Medication #1 emtricitabine-tenofovir alafen (DESCOVY) 200-25 mg Tab (Order#[id number], Rx#[id number])            Refill Questions - Documented Responses      Flowsheet Row Most Recent Value   Patient Availability and HIPAA Verification    Does patient want to proceed with activity? Yes   HIPAA/medical authority confirmed? Yes   Relationship to patient of person spoken to? Self   Refill Screening Questions    Changes to allergies? No   Changes to medications? No   New conditions since last clinic visit? No   Unplanned office visit, urgent care, ED, or hospital admission in the last 4 weeks? No   How does patient/caregiver feel medication is working? Very good   Financial problems or insurance changes? No   How many doses of your specialty medications were missed in the last 4 weeks? 0   Would patient like to speak to a pharmacist? No   When does the patient need to receive the medication? 01/19/23   Refill Delivery Questions    How will the patient receive the medication? MEDRx   When does the patient need to receive the medication? 01/19/23   Shipping Address Home   Address in [facility] confirmed and updated if neccessary? Yes   Expected Copay ($) 0   Is the patient able to afford the medication copay? Yes   Payment Method zero copay   Days supply of Refill 30   Supplies needed? No supplies needed   Refill activity completed? Yes   Refill activity plan Refill scheduled   Shipment/Pickup Date: 01/18/23            Current Outpatient Medications   Medication Sig    ACZONE 7.5 % GlwP APPLY TO THE AFFECTED AREA ON FACE EVERY DAY    emtricitabine-tenofovir alafen (DESCOVY) 200-25 mg Tab Take 1 tablet by mouth once daily.    FABIOR 0.1 % Foam APPLY TO THE AFFECTED AREA ON BACK EVERY EVENING    ibuprofen (ADVIL,MOTRIN) 600 MG tablet Take 1 tablet (600 mg total) by mouth every 6 (six)  hours as needed for Pain.    ondansetron (ZOFRAN-ODT) 4 MG TbDL Take 2 tablets (8 mg total) by mouth every 8 (eight) hours as needed (n/v).    permethrin (ELIMITE) 5 % cream Apply to affected area once. Use as directed   Last reviewed on 10/18/2021  3:22 PM by Arlette El    Review of patient's allergies indicates:  No Known Allergies Last reviewed on  12/28/2022 2:01 PM by Jeanne Singh      Tasks added this encounter   2/11/2023 - Refill Call (Auto Added)   Tasks due within next 3 months   No tasks due.     Marck Alex, PharmD  [facility]  [address] [facility] [address]  Phone: [phone]  Fax: [phone]

## 2023-02-09 ENCOUNTER — TELEPHONE (OUTPATIENT)
Dept: PHARMACY | Facility: CLINIC | Age: 28
End: 2023-02-09
Payer: MEDICAID

## 2023-02-09 NOTE — TELEPHONE ENCOUNTER
Patient inquire about doxycycline newly prescribed on descovy and adderall. No DDI but has some nausea and upset stomach with doxcycline.Advise patient to take with food prior to next dose to lessen issue. Avoid pepto bismol with doxycycline. No other question or concern.

## 2023-02-13 ENCOUNTER — PATIENT MESSAGE (OUTPATIENT)
Dept: PHARMACY | Facility: CLINIC | Age: 28
End: 2023-02-13
Payer: MEDICAID

## 2023-02-16 ENCOUNTER — SPECIALTY PHARMACY (OUTPATIENT)
Dept: PHARMACY | Facility: CLINIC | Age: 28
End: 2023-02-16
Payer: MEDICAID

## 2023-02-16 NOTE — TELEPHONE ENCOUNTER
Specialty Pharmacy - Refill Coordination    Specialty Medication Orders Linked to Encounter      Flowsheet Row Most Recent Value   Medication #1 emtricitabine-tenofovir alafen (DESCOVY) 200-25 mg Tab (Order#[id number], Rx#[id number])            Refill Questions - Documented Responses      Flowsheet Row Most Recent Value   Patient Availability and HIPAA Verification    Does patient want to proceed with activity? Yes   HIPAA/medical authority confirmed? Yes   Relationship to patient of person spoken to? Self   Refill Screening Questions    Changes to allergies? No   Changes to medications? No   New conditions since last clinic visit? No   Unplanned office visit, urgent care, ED, or hospital admission in the last 4 weeks? No   How does patient/caregiver feel medication is working? Very good   Financial problems or insurance changes? No   How many doses of your specialty medications were missed in the last 4 weeks? 0   Would patient like to speak to a pharmacist? No   When does the patient need to receive the medication? 02/19/23   Refill Delivery Questions    How will the patient receive the medication? Pickup   When does the patient need to receive the medication? 02/19/23   Expected Copay ($) 0   Is the patient able to afford the medication copay? Yes   Payment Method zero copay   Days supply of Refill 30   Supplies needed? No supplies needed   Refill activity completed? Yes   Refill activity plan Refill scheduled   Shipment/Pickup Date: 02/17/23            Current Outpatient Medications   Medication Sig    ACZONE 7.5 % GlwP APPLY TO THE AFFECTED AREA ON FACE EVERY DAY    emtricitabine-tenofovir alafen (DESCOVY) 200-25 mg Tab Take 1 tablet by mouth once daily.    FABIOR 0.1 % Foam APPLY TO THE AFFECTED AREA ON BACK EVERY EVENING    ibuprofen (ADVIL,MOTRIN) 600 MG tablet Take 1 tablet (600 mg total) by mouth every 6 (six) hours as needed for Pain.    ondansetron (ZOFRAN-ODT) 4 MG TbDL Take 2 tablets (8 mg total) by  mouth every 8 (eight) hours as needed (n/v).    permethrin (ELIMITE) 5 % cream Apply to affected area once. Use as directed   Last reviewed on 10/18/2021  3:22 PM by Arlette El    Review of patient's allergies indicates:  No Known Allergies Last reviewed on  12/28/2022 2:01 PM by Jeanne Singh      Tasks added this encounter   3/14/2023 - Refill Call (Auto Added)  2/18/2023 - Pickup Reminder   Tasks due within next 3 months   No tasks due.     Yaritza He  [facility]  [address] [facility] [address]  Phone: [phone]  Fax: [phone]

## 2023-03-16 ENCOUNTER — SPECIALTY PHARMACY (OUTPATIENT)
Dept: PHARMACY | Facility: CLINIC | Age: 28
End: 2023-03-16
Payer: MEDICAID

## 2023-03-16 NOTE — TELEPHONE ENCOUNTER
Specialty Pharmacy - Refill Coordination    Specialty Medication Orders Linked to Encounter      Flowsheet Row Most Recent Value   Medication #1 emtricitabine-tenofovir alafen (DESCOVY) 200-25 mg Tab (Order#[id number], Rx#[id number])            Refill Questions - Documented Responses      Flowsheet Row Most Recent Value   Patient Availability and HIPAA Verification    Does patient want to proceed with activity? Yes   HIPAA/medical authority confirmed? Yes   Relationship to patient of person spoken to? Self   Refill Screening Questions    When does the patient need to receive the medication? 03/19/23   Refill Delivery Questions    How will the patient receive the medication? Pickup   When does the patient need to receive the medication? 03/19/23   Expected Copay ($) 0   Is the patient able to afford the medication copay? Yes   Payment Method zero copay   Days supply of Refill 30   Supplies needed? No supplies needed   Refill activity completed? Yes   Refill activity plan Refill scheduled   Shipment/Pickup Date: 03/17/23            Current Outpatient Medications   Medication Sig    ACZONE 7.5 % GlwP APPLY TO THE AFFECTED AREA ON FACE EVERY DAY    emtricitabine-tenofovir alafen (DESCOVY) 200-25 mg Tab Take 1 tablet by mouth once daily.    FABIOR 0.1 % Foam APPLY TO THE AFFECTED AREA ON BACK EVERY EVENING    ibuprofen (ADVIL,MOTRIN) 600 MG tablet Take 1 tablet (600 mg total) by mouth every 6 (six) hours as needed for Pain.    ondansetron (ZOFRAN-ODT) 4 MG TbDL Take 2 tablets (8 mg total) by mouth every 8 (eight) hours as needed (n/v).    permethrin (ELIMITE) 5 % cream Apply to affected area once. Use as directed   Last reviewed on 10/18/2021  3:22 PM by Arlette El    Review of patient's allergies indicates:  No Known Allergies Last reviewed on  12/28/2022 2:01 PM by Jeanne Singh      Tasks added this encounter   No tasks added.   Tasks due within next 3 months   3/14/2023 - Refill Call (Auto Added)     Danica  Jeff, PharmD  [facility]  [address]  [facility] [address]  Phone: [phone]  Fax: [phone]

## 2023-04-11 ENCOUNTER — PATIENT MESSAGE (OUTPATIENT)
Dept: PHARMACY | Facility: CLINIC | Age: 28
End: 2023-04-11
Payer: MEDICAID

## 2023-04-14 ENCOUNTER — PATIENT MESSAGE (OUTPATIENT)
Dept: PHARMACY | Facility: CLINIC | Age: 28
End: 2023-04-14
Payer: MEDICAID

## 2023-04-17 ENCOUNTER — SPECIALTY PHARMACY (OUTPATIENT)
Dept: PHARMACY | Facility: CLINIC | Age: 28
End: 2023-04-17
Payer: MEDICAID

## 2023-04-17 NOTE — TELEPHONE ENCOUNTER
Specialty Pharmacy - Refill Coordination    Specialty Medication Orders Linked to Encounter      Flowsheet Row Most Recent Value   Medication #1 emtricitabine-tenofovir alafen (DESCOVY) 200-25 mg Tab (Order#[id number], Rx#[id number])            Refill Questions - Documented Responses      Flowsheet Row Most Recent Value   Patient Availability and HIPAA Verification    Does patient want to proceed with activity? Yes   Relationship to patient of person spoken to? Self   Refill Screening Questions    Changes to allergies? No   Changes to medications? No   New conditions since last clinic visit? No   Unplanned office visit, urgent care, ED, or hospital admission in the last 4 weeks? No   How does patient/caregiver feel medication is working? Good   Financial problems or insurance changes? No   How many doses of your specialty medications were missed in the last 4 weeks? 0   Would patient like to speak to a pharmacist? No   When does the patient need to receive the medication? 04/19/23   Refill Delivery Questions    How will the patient receive the medication? Pickup   When does the patient need to receive the medication? 04/19/23   Shipping Address Home   Address in [facility] confirmed and updated if neccessary? Yes   Expected Copay ($) 0   Is the patient able to afford the medication copay? Yes   Payment Method zero copay   Days supply of Refill 30   Supplies needed? No supplies needed   Refill activity completed? Yes   Refill activity plan Refill scheduled   Shipment/Pickup Date: 04/19/23            Current Outpatient Medications   Medication Sig    ACZONE 7.5 % GlwP APPLY TO THE AFFECTED AREA ON FACE EVERY DAY    emtricitabine-tenofovir alafen (DESCOVY) 200-25 mg Tab Take 1 tablet by mouth once daily.    FABIOR 0.1 % Foam APPLY TO THE AFFECTED AREA ON BACK EVERY EVENING    ibuprofen (ADVIL,MOTRIN) 600 MG tablet Take 1 tablet (600 mg total) by mouth every 6 (six) hours as needed for Pain.    ondansetron  (ZOFRAN-ODT) 4 MG TbDL Take 2 tablets (8 mg total) by mouth every 8 (eight) hours as needed (n/v).    permethrin (ELIMITE) 5 % cream Apply to affected area once. Use as directed   Last reviewed on 10/18/2021  3:22 PM by Arlette El    Review of patient's allergies indicates:  No Known Allergies Last reviewed on  12/28/2022 2:01 PM by Jeanne Singh      Tasks added this encounter   5/12/2023 - Refill Call (Auto Added)  4/20/2023 - Pickup Reminder   Tasks due within next 3 months   No tasks due.     Rani Palacios, PharmD  [facility]  [address] [place] [address]  Phone: [phone]  Fax: [phone]

## 2023-05-12 ENCOUNTER — PATIENT MESSAGE (OUTPATIENT)
Dept: PHARMACY | Facility: CLINIC | Age: 28
End: 2023-05-12
Payer: MEDICAID

## 2023-05-15 ENCOUNTER — SPECIALTY PHARMACY (OUTPATIENT)
Dept: PHARMACY | Facility: CLINIC | Age: 28
End: 2023-05-15
Payer: MEDICAID

## 2023-05-15 NOTE — TELEPHONE ENCOUNTER
Specialty Pharmacy - Refill Coordination    Specialty Medication Orders Linked to Encounter      Flowsheet Row Most Recent Value   Medication #1 emtricitabine-tenofovir alafen (DESCOVY) 200-25 mg Tab (Order#[id number], Rx#[id number])            Refill Questions - Documented Responses      Flowsheet Row Most Recent Value   Refill Screening Questions    Changes to allergies? No   Changes to medications? No   New conditions since last clinic visit? No   Unplanned office visit, urgent care, ED, or hospital admission in the last 4 weeks? No   How does patient/caregiver feel medication is working? Very good   Financial problems or insurance changes? No   How many doses of your specialty medications were missed in the last 4 weeks? 0   Would patient like to speak to a pharmacist? No   When does the patient need to receive the medication? 05/19/23   Refill Delivery Questions    How will the patient receive the medication? Pickup   When does the patient need to receive the medication? 05/19/23   Expected Copay ($) 0   Is the patient able to afford the medication copay? Yes   Payment Method zero copay   Days supply of Refill 30   Supplies needed? No supplies needed   Refill activity completed? Yes   Refill activity plan Refill scheduled   Shipment/Pickup Date: 05/16/23            Current Outpatient Medications   Medication Sig    ACZONE 7.5 % GlwP APPLY TO THE AFFECTED AREA ON FACE EVERY DAY    emtricitabine-tenofovir alafen (DESCOVY) 200-25 mg Tab Take 1 tablet by mouth once daily.    FABIOR 0.1 % Foam APPLY TO THE AFFECTED AREA ON BACK EVERY EVENING    ibuprofen (ADVIL,MOTRIN) 600 MG tablet Take 1 tablet (600 mg total) by mouth every 6 (six) hours as needed for Pain.    ondansetron (ZOFRAN-ODT) 4 MG TbDL Take 2 tablets (8 mg total) by mouth every 8 (eight) hours as needed (n/v).    permethrin (ELIMITE) 5 % cream Apply to affected area once. Use as directed   Last reviewed on 10/18/2021  3:22 PM by Arlette JUÁREZ  Nikko    Review of patient's allergies indicates:  No Known Allergies Last reviewed on  12/28/2022 2:01 PM by Jeanne Singh      Tasks added this encounter   No tasks added.   Tasks due within next 3 months   5/15/2023 - Refill Coordination Outreach (1 time occurrence)     Sherley JasonD  [facility]  [address]  [facility] [address]  Phone: [phone]  Fax: [phone]

## 2023-06-13 ENCOUNTER — SPECIALTY PHARMACY (OUTPATIENT)
Dept: PHARMACY | Facility: CLINIC | Age: 28
End: 2023-06-13
Payer: MEDICAID

## 2023-06-13 NOTE — TELEPHONE ENCOUNTER
Specialty Pharmacy - Refill Coordination    Specialty Medication Orders Linked to Encounter      Flowsheet Row Most Recent Value   Medication #1 emtricitabine-tenofovir alafen (DESCOVY) 200-25 mg Tab (Order#[id number], Rx#[id number])            Refill Questions - Documented Responses      Flowsheet Row Most Recent Value   Patient Availability and HIPAA Verification    Does patient want to proceed with activity? Yes   HIPAA/medical authority confirmed? Yes   Relationship to patient of person spoken to? Self   Refill Screening Questions    Changes to allergies? No   Changes to medications? No   New conditions since last clinic visit? No   Unplanned office visit, urgent care, ED, or hospital admission in the last 4 weeks? No   How does patient/caregiver feel medication is working? Good   Financial problems or insurance changes? No   How many doses of your specialty medications were missed in the last 4 weeks? 0   Would patient like to speak to a pharmacist? No   When does the patient need to receive the medication? 06/19/23   Refill Delivery Questions    How will the patient receive the medication? Pickup   When does the patient need to receive the medication? 06/19/23   Expected Copay ($) 0   Is the patient able to afford the medication copay? Yes   Payment Method zero copay   Days supply of Refill 30   Supplies needed? No supplies needed   Refill activity completed? Yes   Refill activity plan Refill scheduled   Shipment/Pickup Date: 06/19/23            Current Outpatient Medications   Medication Sig    ACZONE 7.5 % GlwP APPLY TO THE AFFECTED AREA ON FACE EVERY DAY    emtricitabine-tenofovir alafen (DESCOVY) 200-25 mg Tab Take 1 tablet by mouth Once a day 30 days    FABIOR 0.1 % Foam APPLY TO THE AFFECTED AREA ON BACK EVERY EVENING    ibuprofen (ADVIL,MOTRIN) 600 MG tablet Take 1 tablet (600 mg total) by mouth every 6 (six) hours as needed for Pain.    ondansetron (ZOFRAN-ODT) 4 MG TbDL Take 2 tablets (8 mg total) by  mouth every 8 (eight) hours as needed (n/v).    permethrin (ELIMITE) 5 % cream Apply to affected area once. Use as directed   Last reviewed on 10/18/2021  3:22 PM by Arlette El    Review of patient's allergies indicates:  No Known Allergies Last reviewed on  12/28/2022 2:01 PM by Jeanne Singh      Tasks added this encounter   No tasks added.   Tasks due within next 3 months   6/12/2023 - Refill Coordination Outreach (1 time occurrence)     Adela Echeverria, PharmD  [facility]  [address] [place] [address]  Phone: [phone]  Fax: [phone]

## 2023-07-18 ENCOUNTER — SPECIALTY PHARMACY (OUTPATIENT)
Dept: PHARMACY | Facility: CLINIC | Age: 28
End: 2023-07-18
Payer: MEDICAID

## 2023-07-18 NOTE — TELEPHONE ENCOUNTER
Specialty Pharmacy - Refill Coordination    Specialty Medication Orders Linked to Encounter      Flowsheet Row Most Recent Value   Medication #1 emtricitabine-tenofovir alafen (DESCOVY) 200-25 mg Tab (Order#[id number], Rx#[id number])       Refill Questions - Documented Responses      Flowsheet Row Most Recent Value   Patient Availability and HIPAA Verification    Does patient want to proceed with activity? Yes   HIPAA/medical authority confirmed? Yes   Relationship to patient of person spoken to? Self   Refill Screening Questions    Changes to allergies? No   Changes to medications? No   New conditions since last clinic visit? No   Unplanned office visit, urgent care, ED, or hospital admission in the last 4 weeks? No   How does patient/caregiver feel medication is working? Good   Financial problems or insurance changes? No   How many doses of your specialty medications were missed in the last 4 weeks? 0   Would patient like to speak to a pharmacist? No   When does the patient need to receive the medication? 07/19/23   Refill Delivery Questions    How will the patient receive the medication? Pickup   When does the patient need to receive the medication? 07/19/23   Shipping Address Home   Address in [facility] confirmed and updated if neccessary? Yes   Expected Copay ($) 0   Is the patient able to afford the medication copay? Yes   Payment Method zero copay   Days supply of Refill 30   Supplies needed? No supplies needed   Refill activity completed? Yes   Refill activity plan Refill scheduled   Shipment/Pickup Date: 07/19/23            Current Outpatient Medications   Medication Sig    ACZONE 7.5 % GlwP APPLY TO THE AFFECTED AREA ON FACE EVERY DAY    emtricitabine-tenofovir alafen (DESCOVY) 200-25 mg Tab Take 1 tablet by mouth once a day for 30 days.    FABIOR 0.1 % Foam APPLY TO THE AFFECTED AREA ON BACK EVERY EVENING    ibuprofen (ADVIL,MOTRIN) 600 MG tablet Take 1 tablet (600 mg total) by mouth every 6 (six)  hours as needed for Pain.    ondansetron (ZOFRAN-ODT) 4 MG TbDL Take 2 tablets (8 mg total) by mouth every 8 (eight) hours as needed (n/v).    permethrin (ELIMITE) 5 % cream Apply to affected area once. Use as directed   Last reviewed on 10/18/2021  3:22 PM by Arlette El    Review of patient's allergies indicates:  No Known Allergies Last reviewed on  12/28/2022 2:01 PM by Jeanne Singh      Tasks added this encounter   No tasks added.   Tasks due within next 3 months   7/21/2023 - Refill Coordination Outreach (1 time occurrence)     Katarina Hastings, PharmD  [facility]  [address] [place] [address]  Phone: [phone]  Fax: [phone]

## 2023-08-11 ENCOUNTER — SPECIALTY PHARMACY (OUTPATIENT)
Dept: PHARMACY | Facility: CLINIC | Age: 28
End: 2023-08-11
Payer: MEDICAID

## 2023-08-14 NOTE — TELEPHONE ENCOUNTER
Specialty Pharmacy - Refill Coordination    Specialty Medication Orders Linked to Encounter      Flowsheet Row Most Recent Value   Medication #1 emtricitabine-tenofovir alafen (DESCOVY) 200-25 mg Tab (Order#[id number], Rx#[id number])            Refill Questions - Documented Responses      Flowsheet Row Most Recent Value   Patient Availability and HIPAA Verification    Does patient want to proceed with activity? Yes   HIPAA/medical authority confirmed? Yes   Relationship to patient of person spoken to? Self   Refill Screening Questions    Changes to allergies? No   Changes to medications? No   New conditions since last clinic visit? No   Unplanned office visit, urgent care, ED, or hospital admission in the last 4 weeks? No   How does patient/caregiver feel medication is working? Good   Financial problems or insurance changes? No   How many doses of your specialty medications were missed in the last 4 weeks? 0   Would patient like to speak to a pharmacist? No   When does the patient need to receive the medication? 08/16/23   Refill Delivery Questions    How will the patient receive the medication? Pickup   When does the patient need to receive the medication? 08/16/23   Shipping Address Home   Address in [facility] confirmed and updated if neccessary? Yes   Expected Copay ($) 0   Is the patient able to afford the medication copay? Yes   Payment Method zero copay   Days supply of Refill 30   Refill activity completed? Yes   Refill activity plan Refill scheduled   Shipment/Pickup Date: 08/15/23            Current Outpatient Medications   Medication Sig    ACZONE 7.5 % GlwP APPLY TO THE AFFECTED AREA ON FACE EVERY DAY    emtricitabine-tenofovir alafen (DESCOVY) 200-25 mg Tab Take 1 tablet by mouth once a day for 30 days.    FABIOR 0.1 % Foam APPLY TO THE AFFECTED AREA ON BACK EVERY EVENING    ibuprofen (ADVIL,MOTRIN) 600 MG tablet Take 1 tablet (600 mg total) by mouth every 6 (six) hours as needed for Pain.     ondansetron (ZOFRAN-ODT) 4 MG TbDL Take 2 tablets (8 mg total) by mouth every 8 (eight) hours as needed (n/v).    permethrin (ELIMITE) 5 % cream Apply to affected area once. Use as directed   Last reviewed on 10/18/2021  3:22 PM by Arlette El    Review of patient's allergies indicates:  No Known Allergies Last reviewed on  12/28/2022 2:01 PM by Jeanne Singh      Tasks added this encounter   8/19/2023 - Pickup Reminder   Tasks due within next 3 months   No tasks due.     [facility]  [address] [facility] [address]  Phone: [phone]  Fax: [phone]